# Patient Record
Sex: FEMALE | Race: WHITE | NOT HISPANIC OR LATINO | Employment: OTHER | URBAN - METROPOLITAN AREA
[De-identification: names, ages, dates, MRNs, and addresses within clinical notes are randomized per-mention and may not be internally consistent; named-entity substitution may affect disease eponyms.]

---

## 2017-08-21 ENCOUNTER — HISTORICAL (OUTPATIENT)
Dept: ADMINISTRATIVE | Facility: HOSPITAL | Age: 52
End: 2017-08-21

## 2017-12-18 ENCOUNTER — HISTORICAL (OUTPATIENT)
Dept: ANESTHESIOLOGY | Facility: HOSPITAL | Age: 52
End: 2017-12-18

## 2017-12-19 ENCOUNTER — HISTORICAL (OUTPATIENT)
Dept: LAB | Facility: HOSPITAL | Age: 52
End: 2017-12-19

## 2017-12-22 ENCOUNTER — HISTORICAL (OUTPATIENT)
Dept: ADMINISTRATIVE | Facility: HOSPITAL | Age: 52
End: 2017-12-22

## 2018-04-06 ENCOUNTER — HISTORICAL (OUTPATIENT)
Dept: LAB | Facility: HOSPITAL | Age: 53
End: 2018-04-06

## 2018-04-06 LAB
ABS NEUT (OLG): 1.7 X10(3)/MCL (ref 2.1–9.2)
ALBUMIN SERPL-MCNC: 4.1 GM/DL (ref 3.4–5)
ALBUMIN/GLOB SERPL: 1.1 RATIO (ref 1.1–2)
ALP SERPL-CCNC: 80 UNIT/L (ref 38–126)
ALT SERPL-CCNC: 16 UNIT/L (ref 12–78)
APTT PPP: 30.8 SECOND(S) (ref 24.8–36.9)
AST SERPL-CCNC: 15 UNIT/L (ref 15–37)
BASOPHILS # BLD AUTO: 0.1 X10(3)/MCL (ref 0–0.2)
BASOPHILS NFR BLD AUTO: 1 %
BILIRUB SERPL-MCNC: 0.4 MG/DL (ref 0.2–1)
BILIRUBIN DIRECT+TOT PNL SERPL-MCNC: 0.1 MG/DL (ref 0–0.5)
BILIRUBIN DIRECT+TOT PNL SERPL-MCNC: 0.3 MG/DL (ref 0–0.8)
BUN SERPL-MCNC: 17 MG/DL (ref 7–18)
CALCIUM SERPL-MCNC: 10.1 MG/DL (ref 8.5–10.1)
CHLORIDE SERPL-SCNC: 104 MMOL/L (ref 98–107)
CO2 SERPL-SCNC: 30 MMOL/L (ref 21–32)
CREAT SERPL-MCNC: 0.64 MG/DL (ref 0.55–1.02)
EOSINOPHIL # BLD AUTO: 0.4 X10(3)/MCL (ref 0–0.9)
EOSINOPHIL NFR BLD AUTO: 9 %
ERYTHROCYTE [DISTWIDTH] IN BLOOD BY AUTOMATED COUNT: 13.4 % (ref 11.5–17)
GLOBULIN SER-MCNC: 3.9 GM/DL (ref 2.4–3.5)
GLUCOSE SERPL-MCNC: 84 MG/DL (ref 74–106)
HCT VFR BLD AUTO: 38.6 % (ref 37–47)
HGB BLD-MCNC: 12.1 GM/DL (ref 12–16)
INR PPP: 0.89 (ref 0–1.27)
LYMPHOCYTES # BLD AUTO: 2.3 X10(3)/MCL (ref 0.6–4.6)
LYMPHOCYTES NFR BLD AUTO: 46 %
MCH RBC QN AUTO: 30.3 PG (ref 27–31)
MCHC RBC AUTO-ENTMCNC: 31.3 GM/DL (ref 33–36)
MCV RBC AUTO: 96.7 FL (ref 80–94)
MONOCYTES # BLD AUTO: 0.4 X10(3)/MCL (ref 0.1–1.3)
MONOCYTES NFR BLD AUTO: 9 %
NEUTROPHILS # BLD AUTO: 1.7 X10(3)/MCL (ref 2.1–9.2)
NEUTROPHILS NFR BLD AUTO: 35 %
PLATELET # BLD AUTO: 247 X10(3)/MCL (ref 130–400)
PMV BLD AUTO: 11 FL (ref 9.4–12.4)
POTASSIUM SERPL-SCNC: 4.6 MMOL/L (ref 3.5–5.1)
PROT SERPL-MCNC: 8 GM/DL (ref 6.4–8.2)
PROTHROMBIN TIME: 12.3 SECOND(S) (ref 12.2–14.7)
RBC # BLD AUTO: 3.99 X10(6)/MCL (ref 4.2–5.4)
SODIUM SERPL-SCNC: 137 MMOL/L (ref 136–145)
WBC # SPEC AUTO: 4.9 X10(3)/MCL (ref 4.5–11.5)

## 2018-04-10 ENCOUNTER — HISTORICAL (OUTPATIENT)
Dept: ADMINISTRATIVE | Facility: HOSPITAL | Age: 53
End: 2018-04-10

## 2018-08-03 ENCOUNTER — HISTORICAL (OUTPATIENT)
Dept: ADMINISTRATIVE | Facility: HOSPITAL | Age: 53
End: 2018-08-03

## 2018-08-15 ENCOUNTER — HISTORICAL (OUTPATIENT)
Dept: INTENSIVE CARE | Facility: HOSPITAL | Age: 53
End: 2018-08-15

## 2019-02-28 ENCOUNTER — HISTORICAL (OUTPATIENT)
Dept: ADMINISTRATIVE | Facility: HOSPITAL | Age: 54
End: 2019-02-28

## 2019-02-28 LAB
ABS NEUT (OLG): 5.56 X10(3)/MCL (ref 2.1–9.2)
ALBUMIN SERPL-MCNC: 3.8 GM/DL (ref 3.4–5)
ALBUMIN/GLOB SERPL: 1 {RATIO}
ALP SERPL-CCNC: 87 UNIT/L (ref 38–126)
ALT SERPL-CCNC: 16 UNIT/L (ref 12–78)
AST SERPL-CCNC: 14 UNIT/L (ref 15–37)
BASOPHILS # BLD AUTO: 0 X10(3)/MCL (ref 0–0.2)
BASOPHILS NFR BLD AUTO: 0 %
BILIRUB SERPL-MCNC: 0.1 MG/DL (ref 0.2–1)
BILIRUBIN DIRECT+TOT PNL SERPL-MCNC: 0 MG/DL (ref 0–0.8)
BILIRUBIN DIRECT+TOT PNL SERPL-MCNC: 0.1 MG/DL (ref 0–0.2)
BUN SERPL-MCNC: 17 MG/DL (ref 7–18)
CALCIUM SERPL-MCNC: 9.1 MG/DL (ref 8.5–10.1)
CHLORIDE SERPL-SCNC: 106 MMOL/L (ref 98–107)
CO2 SERPL-SCNC: 28 MMOL/L (ref 21–32)
CREAT SERPL-MCNC: 0.72 MG/DL (ref 0.55–1.02)
EOSINOPHIL # BLD AUTO: 0.3 X10(3)/MCL (ref 0–0.9)
EOSINOPHIL NFR BLD AUTO: 3 %
ERYTHROCYTE [DISTWIDTH] IN BLOOD BY AUTOMATED COUNT: 13.6 % (ref 11.5–17)
GLOBULIN SER-MCNC: 3.7 GM/DL (ref 2.4–3.5)
GLUCOSE SERPL-MCNC: 95 MG/DL (ref 74–106)
HBV SURFACE AG SERPL QL IA: NEGATIVE
HCT VFR BLD AUTO: 35.1 % (ref 37–47)
HCV AB SERPL QL IA: NEGATIVE
HGB BLD-MCNC: 10.4 GM/DL (ref 12–16)
LYMPHOCYTES # BLD AUTO: 2.5 X10(3)/MCL (ref 0.6–4.6)
LYMPHOCYTES NFR BLD AUTO: 27 %
MAGNESIUM SERPL-MCNC: 1.9 MG/DL (ref 1.8–2.4)
MCH RBC QN AUTO: 29.9 PG (ref 27–31)
MCHC RBC AUTO-ENTMCNC: 29.6 GM/DL (ref 33–36)
MCV RBC AUTO: 100.9 FL (ref 80–94)
MONOCYTES # BLD AUTO: 0.7 X10(3)/MCL (ref 0.1–1.3)
MONOCYTES NFR BLD AUTO: 8 %
NEUTROPHILS # BLD AUTO: 5.56 X10(3)/MCL (ref 2.1–9.2)
NEUTROPHILS NFR BLD AUTO: 61 %
PLATELET # BLD AUTO: 327 X10(3)/MCL (ref 130–400)
PMV BLD AUTO: 10.8 FL (ref 9.4–12.4)
POTASSIUM SERPL-SCNC: 4.5 MMOL/L (ref 3.5–5.1)
PROT SERPL-MCNC: 7.5 GM/DL (ref 6.4–8.2)
RBC # BLD AUTO: 3.48 X10(6)/MCL (ref 4.2–5.4)
SODIUM SERPL-SCNC: 139 MMOL/L (ref 136–145)
WBC # SPEC AUTO: 9.1 X10(3)/MCL (ref 4.5–11.5)

## 2019-03-04 ENCOUNTER — HISTORICAL (OUTPATIENT)
Dept: ADMINISTRATIVE | Facility: HOSPITAL | Age: 54
End: 2019-03-04

## 2019-03-06 LAB — FINAL CULTURE: NORMAL

## 2019-03-21 ENCOUNTER — TELEPHONE (OUTPATIENT)
Dept: GASTROENTEROLOGY | Facility: CLINIC | Age: 54
End: 2019-03-21

## 2019-04-04 LAB — CRC RECOMMENDATION EXT: NORMAL

## 2019-04-15 ENCOUNTER — TELEPHONE (OUTPATIENT)
Dept: GASTROENTEROLOGY | Facility: CLINIC | Age: 54
End: 2019-04-15

## 2019-04-15 NOTE — TELEPHONE ENCOUNTER
----- Message from Judy Brooke RN sent at 4/12/2019  5:16 PM CDT -----  ELKIN  ----- Message -----  From: Rolando Rodriguez  Sent: 4/11/2019   8:40 AM  To: Judy Brooke RN    Good morning. Just was letting you know some additional records you were waiting for were faxed over. I scanned them into media mgr within Epic,thanks

## 2019-04-15 NOTE — TELEPHONE ENCOUNTER
Printed pt's media from [employer]. from 04/11.    Contacted referring MD and spoke w/ Carley.   Informed Carley Fam is out of office. Once she returns we will talk to her about the pt and contact them after.

## 2019-04-23 ENCOUNTER — TELEPHONE (OUTPATIENT)
Dept: GASTROENTEROLOGY | Facility: CLINIC | Age: 54
End: 2019-04-23

## 2019-04-23 NOTE — TELEPHONE ENCOUNTER
Message left for patient to return call regarding scheduling an appointment per Dr Carpio referral.

## 2019-04-24 ENCOUNTER — TELEPHONE (OUTPATIENT)
Dept: GASTROENTEROLOGY | Facility: CLINIC | Age: 54
End: 2019-04-24

## 2019-04-24 NOTE — TELEPHONE ENCOUNTER
Spoke with patient. Appointment scheduled for 7/19/19 at 3pm. Appointment letter mailed to patient.

## 2019-07-19 ENCOUNTER — OFFICE VISIT (OUTPATIENT)
Dept: GASTROENTEROLOGY | Facility: CLINIC | Age: 54
End: 2019-07-19
Payer: COMMERCIAL

## 2019-07-19 ENCOUNTER — LAB VISIT (OUTPATIENT)
Dept: LAB | Facility: HOSPITAL | Age: 54
End: 2019-07-19
Attending: INTERNAL MEDICINE
Payer: COMMERCIAL

## 2019-07-19 VITALS
SYSTOLIC BLOOD PRESSURE: 121 MMHG | DIASTOLIC BLOOD PRESSURE: 73 MMHG | HEIGHT: 62 IN | HEART RATE: 81 BPM | BODY MASS INDEX: 23 KG/M2 | WEIGHT: 125 LBS

## 2019-07-19 DIAGNOSIS — R19.7 DIARRHEA, UNSPECIFIED TYPE: ICD-10-CM

## 2019-07-19 DIAGNOSIS — R19.7 DIARRHEA, UNSPECIFIED TYPE: Primary | ICD-10-CM

## 2019-07-19 PROCEDURE — 99204 OFFICE O/P NEW MOD 45 MIN: CPT | Mod: S$GLB,,, | Performed by: INTERNAL MEDICINE

## 2019-07-19 PROCEDURE — 3008F BODY MASS INDEX DOCD: CPT | Mod: CPTII,S$GLB,, | Performed by: INTERNAL MEDICINE

## 2019-07-19 PROCEDURE — 99999 PR PBB SHADOW E&M-EST. PATIENT-LVL III: ICD-10-PCS | Mod: PBBFAC,,, | Performed by: INTERNAL MEDICINE

## 2019-07-19 PROCEDURE — 99999 PR PBB SHADOW E&M-EST. PATIENT-LVL III: CPT | Mod: PBBFAC,,, | Performed by: INTERNAL MEDICINE

## 2019-07-19 PROCEDURE — 36415 COLL VENOUS BLD VENIPUNCTURE: CPT

## 2019-07-19 PROCEDURE — 3008F PR BODY MASS INDEX (BMI) DOCUMENTED: ICD-10-PCS | Mod: CPTII,S$GLB,, | Performed by: INTERNAL MEDICINE

## 2019-07-19 PROCEDURE — 99204 PR OFFICE/OUTPT VISIT, NEW, LEVL IV, 45-59 MIN: ICD-10-PCS | Mod: S$GLB,,, | Performed by: INTERNAL MEDICINE

## 2019-07-19 PROCEDURE — 83516 IMMUNOASSAY NONANTIBODY: CPT | Mod: 59

## 2019-07-19 NOTE — PATIENT INSTRUCTIONS
Pepto bismol tablet 3 tabs up to three times a day.  Discontinue Zoloft after discussing with you doctor  Discontinue Prilosec OTC and try Pepcid instead  Discontinue Excedrin migraine and discuss with a neurologist regarding alternative treatment

## 2019-07-19 NOTE — LETTER
July 19, 2019      Farrukh Carpio MD  [address] [facility]  [facility] [address]           [facility] G[facility]  [address]  [facility] [address]  Phone: [phone]  Fax: [phone]          Patient: Roseann Day   MR Number: [id number]   YOB: 1965   Date of Visit: 7/19/2019       Dear Dr. Farrukh Carpio:    Thank you for referring Roseann Day to me for evaluation. Attached you will find relevant portions of my assessment and plan of care.    If you have questions, please do not hesitate to call me. I look forward to following Roseann Day along with you.    Sincerely,    Nir Brooke MD    Enclosure  CC:  No Recipients    If you would like to receive this communication electronically, please contact [email or web address] or [phone] to request more information on [employer] access.    For providers and/or their staff who would like to refer a patient to [facility], please contact us through our one-stop-shop provider referral line, [facility], at [phone].    If you feel you have received this communication in error or would no longer like to receive these types of communications, please e-mail [email or web address]

## 2019-07-19 NOTE — PROGRESS NOTES
Reason for visit:  History of microscopic colitis    HPI:  Ms. Day is a 54-year-old who has been diagnosed with collagenous colitis in the past but the most recent colonoscopy did not reveal microscopic colitis, in fact the most recent 1 from April of 2019 revealed mild acute colitis.  In the past she has been treated with Entocort given the severity of her diarrhea to 10-15 times a day with nocturnal component.  She denies any blood in the stool.  Upon review of her medications she currently is on Zoloft and a proton pump inhibitor (Prilosec OTC) which are both implicated and microscopic colitis.  She is also taking Excedrin migraine twice a day which has aspirin as 1 of his components, again implicated in microscopic colitis.  Today we discussed about discontinuing Zoloft after discussing with a physician since it was initially started for depression after her parent past.  She will discontinue Prilosec OTC and try Pepcid instead.  As for as Excedrin migraine is concerned she will get in touch with the neurologist for better alternatives.  She is currently on Entocort 9 mg daily has been on it for 3 months with good results.  We discussed about Pepto-Bismol as an alternative to Entocort and she may consider doing that once Entocort is weaned off.  Given the discrepancy in the most recent biopsies I have suggested a repeat colonoscopy if her symptoms do not improve.  Her current weight is stable.  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    Past medical, surgical, social and family history reviewed in epic    Medication allergies reviewed in epic.    Review of systems:  Constitutional:  No fever, no chills, no weight loss, appetite is stable  Eyes:  No visual changes or red eyes  ENT:  No odynophagia or hoarseness of voice  CARDIOVASCULAR:  NO ANGINA OR PALPITATION  Respiratory:  No shortness of breath or wheezing  Genitourinary:  Frequent UTIs, no  hematuria  Skin:  No pruritus or eczema, easy bruising  Neurologic:  Frequent headaches, no seizures  Psychiatry:  No depression currently, no anxiety  Gastrointestinal:  See HPI    Physical exam:  Vitals see epic, awake alert oriented x3 in no acute distress  Neck:  Supple, no carotid bruit  Eyes:  Conjunctivae anicteric, not injected  ENT:  Oral mucosa moist  Abdomen:  Flat, soft, mild tenderness to deep palpation diffusely, bowel sounds are normal, no abdominal bruits heard, no organomegaly  Cardiovascular:  S1, S2 normal, no murmurs or gallops  Respiratory:  Bilateral air entry equal with no rhonchi or crackles  Skin:  Ecchymosis noted  Lower extremities:  No pedal edema    Impression:  Microscopic colitis-currently responding to Entocort    Recommendation:  1.  Discontinue Zoloft, Prilosec and Excedrin migraine  2.  Continue Entocort for now and upon weaning off Entocort consider Pepto-Bismol tablet 3 3 times a day  3.  Check celiac serology  4.  If symptoms do not improve we will consider a repeat colonoscopy with biopsies.

## 2019-07-22 ENCOUNTER — TELEPHONE (OUTPATIENT)
Dept: GASTROENTEROLOGY | Facility: CLINIC | Age: 54
End: 2019-07-22

## 2019-07-22 LAB
GLIADIN PEPTIDE IGA SER-ACNC: 4 UNITS
GLIADIN PEPTIDE IGG SER-ACNC: 2 UNITS
IGA SERPL-MCNC: 296 MG/DL (ref 70–400)
TTG IGA SER-ACNC: 7 UNITS
TTG IGG SER-ACNC: 4 UNITS

## 2019-07-23 ENCOUNTER — TELEPHONE (OUTPATIENT)
Dept: GASTROENTEROLOGY | Facility: CLINIC | Age: 54
End: 2019-07-23

## 2019-07-23 NOTE — TELEPHONE ENCOUNTER
----- Message from Nir Brooke MD sent at 7/22/2019  1:31 PM CDT -----  Please notify patient, the blood test for Celiac disease is negative.

## 2020-02-25 NOTE — TELEPHONE ENCOUNTER
----- Message from Soledad Dougherty sent at 4/23/2019  3:46 PM CDT -----  Contact: Self- [phone]  Pt returning missed call to schedule referral appt- please contact pt at [phone]   PAST MEDICAL HISTORY:  Coronary artery disease     Hypertension

## 2020-07-07 ENCOUNTER — HISTORICAL (OUTPATIENT)
Dept: RADIOLOGY | Facility: HOSPITAL | Age: 55
End: 2020-07-07

## 2020-07-14 ENCOUNTER — HISTORICAL (OUTPATIENT)
Dept: SURGERY | Facility: HOSPITAL | Age: 55
End: 2020-07-14

## 2020-07-14 LAB
ALBUMIN SERPL-MCNC: 3.9 GM/DL (ref 3.5–5)
ALBUMIN/GLOB SERPL: 1.2 RATIO (ref 1.1–2)
ALP SERPL-CCNC: 77 UNIT/L (ref 40–150)
ALT SERPL-CCNC: 20 UNIT/L (ref 0–55)
AST SERPL-CCNC: 23 UNIT/L (ref 5–34)
BILIRUB SERPL-MCNC: 0.3 MG/DL
BILIRUBIN DIRECT+TOT PNL SERPL-MCNC: 0.1 MG/DL (ref 0–0.5)
BILIRUBIN DIRECT+TOT PNL SERPL-MCNC: 0.2 MG/DL (ref 0–0.8)
BUN SERPL-MCNC: 17.3 MG/DL (ref 9.8–20.1)
CALCIUM SERPL-MCNC: 9.2 MG/DL (ref 8.4–10.2)
CHLORIDE SERPL-SCNC: 101 MMOL/L (ref 98–107)
CO2 SERPL-SCNC: 28 MMOL/L (ref 22–29)
CREAT SERPL-MCNC: 0.78 MG/DL (ref 0.55–1.02)
ERYTHROCYTE [DISTWIDTH] IN BLOOD BY AUTOMATED COUNT: 12.9 % (ref 11.5–17)
GLOBULIN SER-MCNC: 3.2 GM/DL (ref 2.4–3.5)
GLUCOSE SERPL-MCNC: 169 MG/DL (ref 74–100)
HCT VFR BLD AUTO: 39.6 % (ref 37–47)
HGB BLD-MCNC: 12.6 GM/DL (ref 12–16)
MCH RBC QN AUTO: 31.5 PG (ref 27–31)
MCHC RBC AUTO-ENTMCNC: 31.8 GM/DL (ref 33–36)
MCV RBC AUTO: 99 FL (ref 80–94)
PLATELET # BLD AUTO: 291 X10(3)/MCL (ref 130–400)
PMV BLD AUTO: 10.9 FL (ref 9.4–12.4)
POTASSIUM SERPL-SCNC: 3.9 MMOL/L (ref 3.5–5.1)
PROT SERPL-MCNC: 7.1 GM/DL (ref 6.4–8.3)
RBC # BLD AUTO: 4 X10(6)/MCL (ref 4.2–5.4)
SODIUM SERPL-SCNC: 139 MMOL/L (ref 136–145)
WBC # SPEC AUTO: 6.9 X10(3)/MCL (ref 4.5–11.5)

## 2021-03-05 ENCOUNTER — HISTORICAL (OUTPATIENT)
Dept: LAB | Facility: HOSPITAL | Age: 56
End: 2021-03-05

## 2021-03-05 LAB
ALBUMIN SERPL-MCNC: 4.3 GM/DL (ref 3.5–5)
ALBUMIN/GLOB SERPL: 1.3 RATIO (ref 1.1–2)
ALP SERPL-CCNC: 84 UNIT/L (ref 40–150)
ALT SERPL-CCNC: 35 UNIT/L (ref 0–55)
AST SERPL-CCNC: 27 UNIT/L (ref 5–34)
BILIRUB SERPL-MCNC: 0.2 MG/DL
BILIRUBIN DIRECT+TOT PNL SERPL-MCNC: 0.1 MG/DL (ref 0–0.5)
BILIRUBIN DIRECT+TOT PNL SERPL-MCNC: 0.1 MG/DL (ref 0–0.8)
BUN SERPL-MCNC: 12.1 MG/DL (ref 9.8–20.1)
CALCIUM SERPL-MCNC: 8.9 MG/DL (ref 8.4–10.2)
CHLORIDE SERPL-SCNC: 104 MMOL/L (ref 98–107)
CO2 SERPL-SCNC: 27 MMOL/L (ref 22–29)
CREAT SERPL-MCNC: 0.74 MG/DL (ref 0.55–1.02)
ERYTHROCYTE [DISTWIDTH] IN BLOOD BY AUTOMATED COUNT: 13.9 % (ref 11.5–17)
GLOBULIN SER-MCNC: 3.3 GM/DL (ref 2.4–3.5)
GLUCOSE SERPL-MCNC: 89 MG/DL (ref 74–100)
HCT VFR BLD AUTO: 41.6 % (ref 37–47)
HGB BLD-MCNC: 12.8 GM/DL (ref 12–16)
MCH RBC QN AUTO: 30.9 PG (ref 27–31)
MCHC RBC AUTO-ENTMCNC: 30.8 GM/DL (ref 33–36)
MCV RBC AUTO: 100.5 FL (ref 80–94)
PLATELET # BLD AUTO: 303 X10(3)/MCL (ref 130–400)
PMV BLD AUTO: 11.8 FL (ref 9.4–12.4)
POTASSIUM SERPL-SCNC: 3.9 MMOL/L (ref 3.5–5.1)
PROT SERPL-MCNC: 7.6 GM/DL (ref 6.4–8.3)
RBC # BLD AUTO: 4.14 X10(6)/MCL (ref 4.2–5.4)
SARS-COV-2 RNA RESP QL NAA+PROBE: NOT DETECTED
SODIUM SERPL-SCNC: 141 MMOL/L (ref 136–145)
WBC # SPEC AUTO: 7.2 X10(3)/MCL (ref 4.5–11.5)

## 2021-04-28 LAB — SARS-COV-2 RNA RESP QL NAA+PROBE: NOT DETECTED

## 2021-04-30 ENCOUNTER — HOSPITAL ENCOUNTER (OUTPATIENT)
Dept: MEDSURG UNIT | Facility: HOSPITAL | Age: 56
End: 2021-05-01
Attending: SURGERY | Admitting: SURGERY

## 2021-04-30 LAB
ABS NEUT (OLG): 9.92 X10(3)/MCL (ref 2.1–9.2)
BASOPHILS # BLD AUTO: 0 X10(3)/MCL (ref 0–0.2)
BASOPHILS NFR BLD AUTO: 0 %
BUN SERPL-MCNC: 10 MG/DL (ref 9.8–20.1)
CALCIUM SERPL-MCNC: 8.3 MG/DL (ref 8.4–10.2)
CHLORIDE SERPL-SCNC: 106 MMOL/L (ref 98–107)
CO2 SERPL-SCNC: 25 MMOL/L (ref 22–29)
CREAT SERPL-MCNC: 0.69 MG/DL (ref 0.55–1.02)
CREAT/UREA NIT SERPL: 14
EOSINOPHIL # BLD AUTO: 0.2 X10(3)/MCL (ref 0–0.9)
EOSINOPHIL NFR BLD AUTO: 2 %
ERYTHROCYTE [DISTWIDTH] IN BLOOD BY AUTOMATED COUNT: 14.9 % (ref 11.5–17)
GLUCOSE SERPL-MCNC: 104 MG/DL (ref 74–100)
HCT VFR BLD AUTO: 38.8 % (ref 37–47)
HGB BLD-MCNC: 11.9 GM/DL (ref 12–16)
LYMPHOCYTES # BLD AUTO: 1.9 X10(3)/MCL (ref 0.6–4.6)
LYMPHOCYTES NFR BLD AUTO: 15 %
MCH RBC QN AUTO: 30.7 PG (ref 27–31)
MCHC RBC AUTO-ENTMCNC: 30.7 GM/DL (ref 33–36)
MCV RBC AUTO: 100.3 FL (ref 80–94)
MONOCYTES # BLD AUTO: 0.6 X10(3)/MCL (ref 0.1–1.3)
MONOCYTES NFR BLD AUTO: 5 %
NEUTROPHILS # BLD AUTO: 9.92 X10(3)/MCL (ref 2.1–9.2)
NEUTROPHILS NFR BLD AUTO: 78 %
PLATELET # BLD AUTO: 282 X10(3)/MCL (ref 130–400)
PMV BLD AUTO: 11.6 FL (ref 9.4–12.4)
POTASSIUM SERPL-SCNC: 4.4 MMOL/L (ref 3.5–5.1)
RBC # BLD AUTO: 3.87 X10(6)/MCL (ref 4.2–5.4)
SODIUM SERPL-SCNC: 143 MMOL/L (ref 136–145)
WBC # SPEC AUTO: 12.7 X10(3)/MCL (ref 4.5–11.5)

## 2021-05-01 LAB
ABS NEUT (OLG): 5.33 X10(3)/MCL (ref 2.1–9.2)
BASOPHILS # BLD AUTO: 0 X10(3)/MCL (ref 0–0.2)
BASOPHILS NFR BLD AUTO: 0 %
BUN SERPL-MCNC: 4.1 MG/DL (ref 9.8–20.1)
CALCIUM SERPL-MCNC: 8.6 MG/DL (ref 8.4–10.2)
CHLORIDE SERPL-SCNC: 109 MMOL/L (ref 98–107)
CO2 SERPL-SCNC: 25 MMOL/L (ref 22–29)
CREAT SERPL-MCNC: 0.61 MG/DL (ref 0.55–1.02)
CREAT/UREA NIT SERPL: 7
EOSINOPHIL # BLD AUTO: 0 X10(3)/MCL (ref 0–0.9)
EOSINOPHIL NFR BLD AUTO: 0 %
ERYTHROCYTE [DISTWIDTH] IN BLOOD BY AUTOMATED COUNT: 15 % (ref 11.5–17)
GLUCOSE SERPL-MCNC: 108 MG/DL (ref 74–100)
HCT VFR BLD AUTO: 32.7 % (ref 37–47)
HGB BLD-MCNC: 10.4 GM/DL (ref 12–16)
LYMPHOCYTES # BLD AUTO: 2.5 X10(3)/MCL (ref 0.6–4.6)
LYMPHOCYTES NFR BLD AUTO: 29 %
MCH RBC QN AUTO: 31 PG (ref 27–31)
MCHC RBC AUTO-ENTMCNC: 31.8 GM/DL (ref 33–36)
MCV RBC AUTO: 97.3 FL (ref 80–94)
MONOCYTES # BLD AUTO: 0.7 X10(3)/MCL (ref 0.1–1.3)
MONOCYTES NFR BLD AUTO: 8 %
NEUTROPHILS # BLD AUTO: 5.33 X10(3)/MCL (ref 2.1–9.2)
NEUTROPHILS NFR BLD AUTO: 62 %
PLATELET # BLD AUTO: 266 X10(3)/MCL (ref 130–400)
PMV BLD AUTO: 11.5 FL (ref 9.4–12.4)
POTASSIUM SERPL-SCNC: 3.6 MMOL/L (ref 3.5–5.1)
RBC # BLD AUTO: 3.36 X10(6)/MCL (ref 4.2–5.4)
SODIUM SERPL-SCNC: 142 MMOL/L (ref 136–145)
WBC # SPEC AUTO: 8.6 X10(3)/MCL (ref 4.5–11.5)

## 2021-12-02 ENCOUNTER — HISTORICAL (OUTPATIENT)
Dept: PREADMISSION TESTING | Facility: HOSPITAL | Age: 56
End: 2021-12-02

## 2021-12-02 LAB
ABS NEUT (OLG): 6 X10(3)/MCL (ref 2.1–9.2)
ALBUMIN SERPL-MCNC: 4 GM/DL (ref 3.5–5)
ALBUMIN/GLOB SERPL: 1.4 RATIO (ref 1.1–2)
ALP SERPL-CCNC: 56 UNIT/L (ref 40–150)
ALT SERPL-CCNC: 23 UNIT/L (ref 0–55)
APTT PPP: 33.3 SECOND(S) (ref 23.2–33.7)
AST SERPL-CCNC: 28 UNIT/L (ref 5–34)
BASOPHILS # BLD AUTO: 0 X10(3)/MCL (ref 0–0.2)
BASOPHILS NFR BLD AUTO: 0 %
BILIRUB SERPL-MCNC: 0.2 MG/DL
BILIRUBIN DIRECT+TOT PNL SERPL-MCNC: <0.1 MG/DL (ref 0–0.5)
BILIRUBIN DIRECT+TOT PNL SERPL-MCNC: >0.1 MG/DL (ref 0–0.8)
BUN SERPL-MCNC: 8.6 MG/DL (ref 9.8–20.1)
CALCIUM SERPL-MCNC: 9.5 MG/DL (ref 8.7–10.5)
CHLORIDE SERPL-SCNC: 106 MMOL/L (ref 98–107)
CO2 SERPL-SCNC: 30 MMOL/L (ref 22–29)
CREAT SERPL-MCNC: 0.7 MG/DL (ref 0.55–1.02)
EOSINOPHIL # BLD AUTO: 0.1 X10(3)/MCL (ref 0–0.9)
EOSINOPHIL NFR BLD AUTO: 1 %
ERYTHROCYTE [DISTWIDTH] IN BLOOD BY AUTOMATED COUNT: 14.6 % (ref 11.5–17)
GLOBULIN SER-MCNC: 2.8 GM/DL (ref 2.4–3.5)
GLUCOSE SERPL-MCNC: 74 MG/DL (ref 74–100)
HCT VFR BLD AUTO: 38.9 % (ref 37–47)
HGB BLD-MCNC: 11.9 GM/DL (ref 12–16)
INR PPP: 1 (ref 0–1.3)
LYMPHOCYTES # BLD AUTO: 3 X10(3)/MCL (ref 0.6–4.6)
LYMPHOCYTES NFR BLD AUTO: 31 %
MCH RBC QN AUTO: 30.2 PG (ref 27–31)
MCHC RBC AUTO-ENTMCNC: 30.6 GM/DL (ref 33–36)
MCV RBC AUTO: 98.7 FL (ref 80–94)
MONOCYTES # BLD AUTO: 0.6 X10(3)/MCL (ref 0.1–1.3)
MONOCYTES NFR BLD AUTO: 6 %
NEUTROPHILS # BLD AUTO: 6 X10(3)/MCL (ref 2.1–9.2)
NEUTROPHILS NFR BLD AUTO: 62 %
PLATELET # BLD AUTO: 341 X10(3)/MCL (ref 130–400)
PMV BLD AUTO: 11.5 FL (ref 9.4–12.4)
POTASSIUM SERPL-SCNC: 3.9 MMOL/L (ref 3.5–5.1)
PROT SERPL-MCNC: 6.8 GM/DL (ref 6.4–8.3)
PROTHROMBIN TIME: 12.5 SECOND(S) (ref 12.5–14.5)
RBC # BLD AUTO: 3.94 X10(6)/MCL (ref 4.2–5.4)
SODIUM SERPL-SCNC: 144 MMOL/L (ref 136–145)
WBC # SPEC AUTO: 9.8 X10(3)/MCL (ref 4.5–11.5)

## 2021-12-09 ENCOUNTER — HISTORICAL (OUTPATIENT)
Dept: SURGERY | Facility: HOSPITAL | Age: 56
End: 2021-12-09

## 2022-04-10 ENCOUNTER — HISTORICAL (OUTPATIENT)
Dept: ADMINISTRATIVE | Facility: HOSPITAL | Age: 57
End: 2022-04-10
Payer: COMMERCIAL

## 2022-04-29 VITALS
DIASTOLIC BLOOD PRESSURE: 86 MMHG | BODY MASS INDEX: 18.4 KG/M2 | WEIGHT: 100 LBS | SYSTOLIC BLOOD PRESSURE: 126 MMHG | HEIGHT: 62 IN

## 2022-04-30 NOTE — OP NOTE
DATE OF SURGERY:    12/09/2021    SURGEON:  Lan Mota MD    PREOPERATIVE DIAGNOSIS:  Chronic obstructive adenotonsillitis.    POSTOPERATIVE DIAGNOSIS:  Chronic obstructive adenotonsillitis.    OPERATION PERFORMED:  KTP laser tonsillectomy and adenoidectomy.    PROCEDURE IN DETAIL:  With proper consent and information, the patient was brought to the operating room and placed on the operating room table in supine position.  After satisfactory endotracheal intubation and general anesthesia, the patient was placed asleep.  The nasopharynx was explored.  The patient had a large amount of adenoid tissue.  This was removed with the KTP laser.  The tonsils were dissected out of the tonsillar fossa using the KTP laser.  She had what appeared to be a mucopyocele in the left tonsil as we detected preoperatively that the left tonsil was involved.  This was sent separately for further evaluation and pathologic evaluation.  She tolerated the procedure very well.  She was transferred back to the recovery room awake, alert, and responsive.        ______________________________  Lan Mota MD    [facility]  DD:  12/13/2021  Time:  12:53PM  DT:  12/13/2021  Time:  05:17PM  Job #:  [id number]

## 2022-04-30 NOTE — OP NOTE
DATE OF SURGERY:    12/22/2017    SURGEON:  Jesus Doss MD    PRE-PROCEDURE DIAGNOSIS:  Biliary dyskinesia.    POST-PROCEDURE DIAGNOSIS:  Biliary dyskinesia.    PROCEDURE PERFORMED:  Laparoscopic cholecystectomy.    ANESTHESIA:  General endotracheal.    INDICATIONS:  This is a 52-year-old female with increasing postprandial right upper quadrant pain radiating to her back and shoulder.  HIDA scan with ejection fraction decreased consistent with biliary dyskinesia.  She presents for an elective gallbladder resection at this time.  The patient does not have any stones.    DESCRIPTION OF PROCEDURE:  The patient was transferred to the operating room, placed on the operating room table in the supine position.  General anesthetic was administered per Anesthesia.  Patient tolerated this well.  Abdomen was prepped with chlorhexidine solution, draped with sterile drapes and cloth.  Time-out was taken to verify the correct patient, procedure, preoperative antibiotic was given.     After time-out was taken, the umbilicus was raised with a Gelpi clamp.  The subumbilical space was infiltrated with 0.5% Marcaine.  A 5 mm transverse incision was made with an 11 blade.  Veress needle was inserted into the abdomen.  The abdomen was insufflated 15 mmHg CO2.  Patient tolerated this well.  A 5 mm Visiport was then placed under direct vision into the abdomen.  No Veress needle injury was noted.  At this point, the patient was positioned with the head up, left side down to expose the gallbladder fossa.  An additional 10 mm subxiphoid port, 2 additional right subcostal 5 mm ports were placed in a similar fashion under direct laparoscopic vision.  After that was completed, the fundus of the gallbladder was grasped and retracted towards the right shoulder.  The gallbladder appeared to be distended with minimal peritoneal attachments consistent with biliary dyskinesia.  The infundibulum of the gallbladder was grasped, retracted  towards the right lower quadrant.  Peritoneum overlying the cystic duct and cystic artery was incised medially and a little laterally using hook electrocautery.  The gallbladder was taken up off the cystic plate to further expose critical view as the cystic duct and cystic artery were the 2 and only 2 structures entering the gallbladder at that point.  With critical view obtained, the cystic duct was doubly clipped proximally, singly clipped distally, transected with the laparoscopic scissors.  Using the automatic clip applier, the cystic artery was clipped in a similar fashion.  The gallbladder was then completely removed from the gallbladder fossa in an avascular plane using hook electrocautery.  It was then placed into the endoscopic retrieval bag and removed through the subxiphoid port without difficulty.  The gallbladder fossa appeared hemostatic.  The cystic duct and cystic artery stump clips were intact.  No bile or bleeding was noted.  At this point, the procedure was complete.  All ports were removed under direct vision.  No bleeding was noted.  The umbilical port was then removed.  Abdomen was allowed to completely desufflate.  Patient tolerated it well.     All skin incisions were irrigated with saline solution.  All skin incisions were then closed with simple interrupted inverted 4-0 Monocryl stitches.  Dermabond was placed to the skin.  The patient tolerated the procedure well.  All needle and sponge counts were correct.    COMPLICATIONS:  None.    SPECIMENS:  Gallbladder.    ESTIMATED BLOOD LOSS:  Less than 10 cc.        ______________________________  MD ALVARADO Sanchez/SD  DD:  12/22/2017  Time:  04:14PM  DT:  12/23/2017  Time:  02:04PM  Job #:  [id number]

## 2022-10-03 ENCOUNTER — DOCUMENTATION ONLY (OUTPATIENT)
Dept: ADMINISTRATIVE | Facility: HOSPITAL | Age: 57
End: 2022-10-03
Payer: COMMERCIAL

## 2023-02-19 ENCOUNTER — HOSPITAL ENCOUNTER (EMERGENCY)
Facility: HOSPITAL | Age: 58
Discharge: HOME | End: 2023-02-20
Attending: EMERGENCY MEDICINE
Payer: COMMERCIAL

## 2023-02-19 DIAGNOSIS — M54.16 LUMBAR RADICULOPATHY, ACUTE: Primary | ICD-10-CM

## 2023-02-19 DIAGNOSIS — M54.41 ACUTE RIGHT-SIDED LOW BACK PAIN WITH RIGHT-SIDED SCIATICA: ICD-10-CM

## 2023-02-19 DIAGNOSIS — M51.36 ANNULAR TEAR OF LUMBAR DISC: ICD-10-CM

## 2023-02-19 LAB
APPEARANCE UR: ABNORMAL
BACTERIA #/AREA URNS AUTO: ABNORMAL /HPF
BILIRUB UR QL STRIP.AUTO: NEGATIVE MG/DL
COLOR UR AUTO: YELLOW
GLUCOSE UR QL STRIP.AUTO: NEGATIVE MG/DL
KETONES UR QL STRIP.AUTO: ABNORMAL MG/DL
LEUKOCYTE ESTERASE UR QL STRIP.AUTO: ABNORMAL UNIT/L
NITRITE UR QL STRIP.AUTO: NEGATIVE
PH UR STRIP.AUTO: 5.5 [PH]
PROT UR QL STRIP.AUTO: ABNORMAL MG/DL
RBC #/AREA URNS AUTO: <5 /HPF
RBC UR QL AUTO: NEGATIVE UNIT/L
SP GR UR STRIP.AUTO: 1.02 (ref 1–1.03)
SQUAMOUS #/AREA URNS AUTO: 25 /HPF
UROBILINOGEN UR STRIP-ACNC: 1 MG/DL
WBC #/AREA URNS AUTO: 10 /HPF

## 2023-02-19 PROCEDURE — 63600175 PHARM REV CODE 636 W HCPCS: Performed by: NURSE PRACTITIONER

## 2023-02-19 PROCEDURE — 63600175 PHARM REV CODE 636 W HCPCS

## 2023-02-19 PROCEDURE — 96374 THER/PROPH/DIAG INJ IV PUSH: CPT

## 2023-02-19 PROCEDURE — 81001 URINALYSIS AUTO W/SCOPE: CPT

## 2023-02-19 PROCEDURE — 99285 EMERGENCY DEPT VISIT HI MDM: CPT | Mod: 25

## 2023-02-19 PROCEDURE — 96375 TX/PRO/DX INJ NEW DRUG ADDON: CPT

## 2023-02-19 PROCEDURE — 96372 THER/PROPH/DIAG INJ SC/IM: CPT | Mod: 59 | Performed by: NURSE PRACTITIONER

## 2023-02-19 PROCEDURE — 25000003 PHARM REV CODE 250

## 2023-02-19 PROCEDURE — 96372 THER/PROPH/DIAG INJ SC/IM: CPT

## 2023-02-19 RX ORDER — CYCLOBENZAPRINE HCL 10 MG
10 TABLET ORAL
Status: COMPLETED | OUTPATIENT
Start: 2023-02-19 | End: 2023-02-19

## 2023-02-19 RX ORDER — ONDANSETRON 2 MG/ML
4 INJECTION INTRAMUSCULAR; INTRAVENOUS
Status: COMPLETED | OUTPATIENT
Start: 2023-02-19 | End: 2023-02-19

## 2023-02-19 RX ORDER — MORPHINE SULFATE 4 MG/ML
4 INJECTION, SOLUTION INTRAMUSCULAR; INTRAVENOUS
Status: COMPLETED | OUTPATIENT
Start: 2023-02-19 | End: 2023-02-19

## 2023-02-19 RX ORDER — DEXAMETHASONE SODIUM PHOSPHATE 4 MG/ML
8 INJECTION, SOLUTION INTRA-ARTICULAR; INTRALESIONAL; INTRAMUSCULAR; INTRAVENOUS; SOFT TISSUE
Status: COMPLETED | OUTPATIENT
Start: 2023-02-19 | End: 2023-02-19

## 2023-02-19 RX ORDER — ORPHENADRINE CITRATE 30 MG/ML
60 INJECTION INTRAMUSCULAR; INTRAVENOUS
Status: COMPLETED | OUTPATIENT
Start: 2023-02-19 | End: 2023-02-19

## 2023-02-19 RX ORDER — KETOROLAC TROMETHAMINE 30 MG/ML
30 INJECTION, SOLUTION INTRAMUSCULAR; INTRAVENOUS
Status: DISCONTINUED | OUTPATIENT
Start: 2023-02-19 | End: 2023-02-19

## 2023-02-19 RX ORDER — HYDROMORPHONE HYDROCHLORIDE 2 MG/ML
1 INJECTION, SOLUTION INTRAMUSCULAR; INTRAVENOUS; SUBCUTANEOUS
Status: COMPLETED | OUTPATIENT
Start: 2023-02-19 | End: 2023-02-19

## 2023-02-19 RX ORDER — KETOROLAC TROMETHAMINE 30 MG/ML
30 INJECTION, SOLUTION INTRAMUSCULAR; INTRAVENOUS
Status: COMPLETED | OUTPATIENT
Start: 2023-02-19 | End: 2023-02-19

## 2023-02-19 RX ADMIN — KETOROLAC TROMETHAMINE 30 MG: 30 INJECTION, SOLUTION INTRAMUSCULAR; INTRAVENOUS at 08:02

## 2023-02-19 RX ADMIN — CYCLOBENZAPRINE 10 MG: 10 TABLET, FILM COATED ORAL at 07:02

## 2023-02-19 RX ADMIN — MORPHINE SULFATE 4 MG: 4 INJECTION INTRAVENOUS at 10:02

## 2023-02-19 RX ADMIN — ORPHENADRINE CITRATE 60 MG: 30 INJECTION INTRAMUSCULAR; INTRAVENOUS at 09:02

## 2023-02-19 RX ADMIN — HYDROMORPHONE HYDROCHLORIDE 1 MG: 2 INJECTION INTRAMUSCULAR; INTRAVENOUS; SUBCUTANEOUS at 11:02

## 2023-02-19 RX ADMIN — ONDANSETRON 4 MG: 2 INJECTION INTRAMUSCULAR; INTRAVENOUS at 10:02

## 2023-02-19 RX ADMIN — DEXAMETHASONE SODIUM PHOSPHATE 8 MG: 4 INJECTION, SOLUTION INTRA-ARTICULAR; INTRALESIONAL; INTRAMUSCULAR; INTRAVENOUS; SOFT TISSUE at 07:02

## 2023-02-19 RX ADMIN — HYDROMORPHONE HYDROCHLORIDE 1 MG: 2 INJECTION INTRAMUSCULAR; INTRAVENOUS; SUBCUTANEOUS at 09:02

## 2023-02-20 VITALS
DIASTOLIC BLOOD PRESSURE: 70 MMHG | HEIGHT: 62 IN | RESPIRATION RATE: 20 BRPM | TEMPERATURE: 98 F | BODY MASS INDEX: 21.16 KG/M2 | SYSTOLIC BLOOD PRESSURE: 138 MMHG | HEART RATE: 84 BPM | WEIGHT: 115 LBS | OXYGEN SATURATION: 100 %

## 2023-02-20 RX ORDER — GABAPENTIN 600 MG/1
600 TABLET ORAL 3 TIMES DAILY
Qty: 90 TABLET | Refills: 1 | Status: SHIPPED | OUTPATIENT
Start: 2023-02-20 | End: 2024-02-20

## 2023-02-20 RX ORDER — PREDNISONE 50 MG/1
50 TABLET ORAL DAILY
Qty: 5 TABLET | Refills: 0 | Status: SHIPPED | OUTPATIENT
Start: 2023-02-20 | End: 2023-02-25

## 2023-02-20 NOTE — FIRST PROVIDER EVALUATION
"Medical screening examination initiated.  I have conducted a focused provider triage encounter, findings are as follows:    Brief history of present illness:  58 year old female presents to ER with c/o lower back pain radiating down right leg. Reports history of sciatic nerve pain. States she took norco, muscle relaxer, and mobic today with no relief of pain.     Vitals:    02/19/23 1859   BP: 123/63   BP Location: Left arm   Patient Position: Sitting   Pulse: 86   Resp: 20   Temp: 97.8 °F (36.6 °C)   TempSrc: Oral   SpO2: 100%   Weight: 52.2 kg (115 lb)   Height: 5' 2.21" (1.58 m)       Pertinent physical exam:  Awake and alert, NAD.    Brief workup plan:  XR, meds    Preliminary workup initiated; this workup will be continued and followed by the physician or advanced practice provider that is assigned to the patient when roomed.  "

## 2023-02-20 NOTE — ED PROVIDER NOTES
Encounter Date: 2/19/2023       History     Chief Complaint   Patient presents with    Back Pain     Complaint of right lower back pain, with radiation down right leg.      57 yo female here with worsening of her chronic back pain. Pain radiates down her right leg. She is in pain management but states pain is way worse than normal even with her medicaitons.     The history is provided by the patient. No  was used.   Review of patient's allergies indicates:  No Known Allergies  Past Medical History:   Diagnosis Date    Sciatica of right side      Past Surgical History:   Procedure Laterality Date    apendix      APPENDECTOMY      CHOLECYSTECTOMY      COLONOSCOPY  04/04/2019    GALLBLADDER SURGERY      HYSTERECTOMY      SINUS SURGERY       Family History   Problem Relation Age of Onset    Colon cancer Neg Hx     Esophageal cancer Neg Hx      Social History     Tobacco Use    Smoking status: Every Day     Types: Vaping with nicotine    Smokeless tobacco: Never   Substance Use Topics    Alcohol use: Yes    Drug use: Never     Review of Systems   Constitutional:  Negative for fever.   Respiratory:  Negative for cough and shortness of breath.    Cardiovascular:  Negative for chest pain.   Gastrointestinal:  Negative for abdominal pain.   Genitourinary:  Negative for difficulty urinating and dysuria.   Musculoskeletal:  Positive for back pain. Negative for gait problem.   Skin:  Negative for color change.   Neurological:  Negative for dizziness, speech difficulty and headaches.   Psychiatric/Behavioral:  Negative for hallucinations and suicidal ideas.    All other systems reviewed and are negative.    Physical Exam     Initial Vitals [02/19/23 1859]   BP Pulse Resp Temp SpO2   123/63 86 20 97.8 °F (36.6 °C) 100 %      MAP       --         Physical Exam    Nursing note and vitals reviewed.  Constitutional: She appears well-developed and well-nourished.   HENT:   Head: Normocephalic.   Eyes: EOM are  normal.   Neck:   Normal range of motion.  Cardiovascular:  Normal rate, regular rhythm, normal heart sounds and intact distal pulses.           Pulmonary/Chest: Breath sounds normal. No respiratory distress.   Abdominal: Abdomen is soft. Bowel sounds are normal. There is no abdominal tenderness.   Musculoskeletal:         General: Normal range of motion.      Cervical back: Normal range of motion.      Comments: Tenderness to the right piriformis region of the buttocks     Neurological: She is alert and oriented to person, place, and time. She has normal strength.   Skin: Skin is warm and dry.   Psychiatric: She has a normal mood and affect. Her behavior is normal. Judgment and thought content normal.       ED Course   Procedures  Labs Reviewed   URINALYSIS, REFLEX TO URINE CULTURE - Abnormal; Notable for the following components:       Result Value    Appearance, UA Cloudy (*)     Protein, UA Trace (*)     Ketones, UA Trace (*)     Leukocyte Esterase, UA Trace (*)     All other components within normal limits   URINALYSIS, MICROSCOPIC - Abnormal; Notable for the following components:    WBC, UA 10 (*)     Squamous Epithelial Cells, UA 25 (*)     Bacteria, UA 1+ (*)     All other components within normal limits          Imaging Results              MRI Lumbar Spine Without Contrast (Preliminary result)  Result time 02/20/23 01:43:03      Preliminary result by Hector Randolph MD (02/20/23 01:43:03)                   Narrative:    START OF REPORT:  Technique: Standard axial sagittal and coronal lumbar spine MRI sequences were performed.    Comparison: Comparison is with L-spine radiographs dated 2023-02-19 19:22:03.    Clinical history: Lower back pain.    Findings:  Distal cord and conus medullaris: Spinal cord and conus medullaris are unremarkable.  Cauda equina and intrathecal contents: Cauda equina appears normal. Intrathecal contents are unremarkable.  Anatomy: Unremarkable.  Alignment: Normal vertebral  alignment is seen; no listhesis is identified.  Integrity of the bone, bone marrow and discs:  Bone: Vertebral body heights are maintained.  Bone marrow: Aside from degenerative changes no abnormal marrow signal is identified.  Discs: Severe disc desiccation with loss of disc height at L5-S1. Severe disc desiccation at L4-L; however, the disc height is maintained.  Findings at specific level:  T12- L1: Nerve roots are normal.  L1- L2: Nerve roots are normal.  L2- L3: Nerve roots are normal.  L3- L4: Nerve roots are normal.  L4- L5: Mild diffuse disc bulge at L4-L5 with central HIZ annular tear (series 7, image 7) (HIZ / high intensity zone). Associated mild bilateral facet hypertrophy. No spinal canal or neural foraminal narrowing.  L5- S1: Diffuse discbulge at L5-S1 with 6 mm broad-based central and right paracentral disc extrusion with caudal migration (series 8, images 7 and 8). Mild bilateral facet hypertrophy. Moderate spinal canal narrowing with right subarticular recess narrowing and likely descending intraspinal right S1 nerve root impingement. Moderate right neural foraminal narrowing is seen with likely exiting right L5 nerve root impingement.      Impression:  1. Mild diffuse disc bulge at L4-L5 with central HIZ annular tear (series 7, image 7) (HIZ / high intensity zone). Associated mild bilateral facet hypertrophy. No spinal canal or neural foraminal narrowing.  2. Diffuse discbulge at L5-S1 with 6 mm broad-based central and right paracentral disc extrusion with caudal migration (series 8, images 7 and 8). Mild bilateral facet hypertrophy. Moderate spinal canal narrowing with right subarticular recess narrowing and likely descending intraspinal right S1 nerve root impingement. Moderate right neural foraminal narrowing is seen with likely exiting right L5 nerve root impingement.  3. Details and other findings, as described above.                          Preliminary result by Interface, Rad Results In  (02/20/23 01:43:03)                   Narrative:    START OF REPORT:  Technique: Standard axial sagittal and coronal lumbar spine MRI sequences were performed.    Comparison: Comparison is with L-spine radiographs dated 2023-02-19 19:22:03.    Clinical history: Lower back pain.    Findings:  Distal cord and conus medullaris: Spinal cord and conus medullaris are unremarkable.  Cauda equina and intrathecal contents: Cauda equina appears normal. Intrathecal contents are unremarkable.  Anatomy: Unremarkable.  Alignment: Normal vertebral alignment is seen; no listhesis is identified.  Integrity of the bone, bone marrow and discs:  Bone: Vertebral body heights are maintained.  Bone marrow: Aside from degenerative changes no abnormal marrow signal is identified.  Discs: Severe disc desiccation with loss of disc height at L5-S1. Severe disc desiccation at L4-L; however, the disc height is maintained.  Findings at specific level:  T12- L1: Nerve roots are normal.  L1- L2: Nerve roots are normal.  L2- L3: Nerve roots are normal.  L3- L4: Nerve roots are normal.  L4- L5: Mild diffuse disc bulge at L4-L5 with central HIZ annular tear (series 7, image 7) (HIZ / high intensity zone). Associated mild bilateral facet hypertrophy. No spinal canal or neural foraminal narrowing.  L5- S1: Diffuse discbulge at L5-S1 with 6 mm broad-based central and right paracentral disc extrusion with caudal migration (series 8, images 7 and 8). Mild bilateral facet hypertrophy. Moderate spinal canal narrowing with right subarticular recess narrowing and likely descending intraspinal right S1 nerve root impingement. Moderate right neural foraminal narrowing is seen with likely exiting right L5 nerve root impingement.      Impression:  1. Mild diffuse disc bulge at L4-L5 with central HIZ annular tear (series 7, image 7) (HIZ / high intensity zone). Associated mild bilateral facet hypertrophy. No spinal canal or neural foraminal narrowing.  2. Diffuse  discbulge at L5-S1 with 6 mm broad-based central and right paracentral disc extrusion with caudal migration (series 8, images 7 and 8). Mild bilateral facet hypertrophy. Moderate spinal canal narrowing with right subarticular recess narrowing and likely descending intraspinal right S1 nerve root impingement. Moderate right neural foraminal narrowing is seen with likely exiting right L5 nerve root impingement.  3. Details and other findings, as described above.                                         X-Ray Lumbar Spine 2 Or 3 Views (Final result)  Result time 02/19/23 19:39:17      Final result by Jomar Blackman MD (02/19/23 19:39:17)                   Impression:      As above.      Electronically signed by: Jomar Blackman  Date:    02/19/2023  Time:    19:39               Narrative:    EXAMINATION:  XR LUMBAR SPINE 2 OR 3 VIEWS    CLINICAL HISTORY:  lower back pain;    TECHNIQUE:  Three views of the lumbar spine.    COMPARISON:  None    FINDINGS:  Vertebral body height disc spaces well maintained.  No displaced fracture.  Vascular atherosclerotic disease of the abdominal aorta is noted.                                       Medications   dexAMETHasone injection 8 mg (8 mg Intramuscular Given 2/19/23 1915)   cyclobenzaprine tablet 10 mg (10 mg Oral Given 2/19/23 1915)   ketorolac injection 30 mg (30 mg Intramuscular Given 2/19/23 2025)   HYDROmorphone (PF) injection 1 mg (1 mg Intramuscular Given 2/19/23 2106)   orphenadrine injection 60 mg (60 mg Intramuscular Given 2/19/23 2106)   morphine injection 4 mg (4 mg Intravenous Given 2/19/23 2215)   ondansetron injection 4 mg (4 mg Intravenous Given 2/19/23 2220)   HYDROmorphone (PF) injection 1 mg (1 mg Intravenous Given 2/19/23 2345)     Medical Decision Making:   Initial Assessment:   Historian:  Patient.  Patient is a 58-year-old female  that presents with right sciatica that has been present chronic. Associated symptoms increased pain over the last few days.  Surrounding information is states she has 2 herniated disc. Exacerbated by movement. Relieved by nothing. Patient treatment prior to arrival medications. Risk factors include none. Other history pertaining to this complaint nothing.   Assessment:  See physical exam.    Differential Diagnosis:   Herniated disc, sciatica, piriformis syndrome, psoas muscle spasm, SI joint irritation, cauda equina, vertebral fracture  Clinical Tests:   Radiological Study: Ordered and Reviewed  ED Management:  History was obtained.  Physical was performed. Required multiple doses of IV narcotics as well as muscle relaxants and steroids  Other:   I have discussed this case with another health care provider.       <> Summary of the Discussion: Consults: Dr Espinoza who recs increasing Gabapentin and can give steroids, okay to follow up            ED Course as of 02/20/23 0829 Mon Feb 20, 2023 0227 Updated patient on MRI results. Her pain has improved to 5/10 and she wants to be discharged. She said she has had the annular tear for a few years and sees pain management for her back.  [KM]   8685 Paged Neurosurgery  [DP]   4042 Call and Consult Neurosurgery  [DP]   4434 Spoke with Dr Espinoza who recommends increasing Gabapentin and okay to give course of steroids  [KM]      ED Course User Index  [DP] Meliza Sidhu  [KM] Eneida Logn MD                 Clinical Impression:   Final diagnoses:  [M54.16] Lumbar radiculopathy, acute (Primary)  [M51.36] Annular tear of lumbar disc  [M54.41] Acute right-sided low back pain with right-sided sciatica        ED Disposition Condition    Discharge Stable          ED Prescriptions       Medication Sig Dispense Start Date End Date Auth. Provider    gabapentin (NEURONTIN) 600 MG tablet Take 1 tablet (600 mg total) by mouth 3 (three) times daily. 90 tablet 2/20/2023 2/20/2024 Eneida Long MD    predniSONE (DELTASONE) 50 MG Tab Take 1 tablet (50 mg total) by mouth once daily. for 5 days 5 tablet 2/20/2023  2/25/2023 Eneida Long MD          Follow-up Information       Follow up With Specialties Details Why Contact Info    Santiago Espinoza MD Neurosurgery Schedule an appointment as soon as possible for a visit   [address] [place] [address] [address]  [address]  [facility] [address] [phone]      [facility]  Emergency Dept Emergency Medicine  As needed, If symptoms worsen [facility] [id number] [phone]             Eneida Long MD  02/20/23 0829

## 2023-02-27 DIAGNOSIS — M43.06 LUMBAR SPONDYLOLYSIS: Primary | ICD-10-CM

## 2023-02-27 DIAGNOSIS — M51.26 DISC DISPLACEMENT, LUMBAR: ICD-10-CM

## 2023-03-07 ENCOUNTER — TELEPHONE (OUTPATIENT)
Dept: NEUROSURGERY | Facility: CLINIC | Age: 58
End: 2023-03-07
Payer: COMMERCIAL

## 2023-03-07 DIAGNOSIS — M51.26 DISC DISPLACEMENT, LUMBAR: Primary | ICD-10-CM

## 2023-03-07 NOTE — TELEPHONE ENCOUNTER
She presented to the ED on 2/20/2023.  They contacted Dr. Espinoza.  I can see her Thursday.  She needs lumbar flex/ext.

## 2023-03-07 NOTE — TELEPHONE ENCOUNTER
"----- Message from Rachel Hudson MA sent at 3/6/2023 11:31 AM CST -----  Regarding: REFERRAL PROCESS- lumbar  This patient is being referred to Dr. Saucedo by Dr. Rapp for lumbar spondylolysis. Reviewing MRI report,"  Central/right subarticular disc extrusion at L5-S1 with inferior migration and impingement on the descending/exiting right S1 nerve roots." Please review imaging and advise on scheduling. Thank you.    "

## 2023-03-08 NOTE — TELEPHONE ENCOUNTER
Spoke with patient. Advised of below. Scheduled her with Ivelisse for 3/9/23 at 8:30. Patient is already going to have CT and Xray today at [facility] so we called [facility] and informed them that the xray needs to be with Flex & ext. Patient verbalized understanding. She has not had any recent testing since MRI 2/2023. Denies seeing any other doctors for this besides referring. Denies any surgeries on her neck/back. She is currently doing pain management with Dr. Rapp (in chart).

## 2023-03-09 ENCOUNTER — OFFICE VISIT (OUTPATIENT)
Dept: NEUROSURGERY | Facility: CLINIC | Age: 58
End: 2023-03-09
Payer: COMMERCIAL

## 2023-03-09 ENCOUNTER — TELEPHONE (OUTPATIENT)
Dept: NEUROSURGERY | Facility: CLINIC | Age: 58
End: 2023-03-09

## 2023-03-09 VITALS
HEIGHT: 62 IN | BODY MASS INDEX: 22.45 KG/M2 | SYSTOLIC BLOOD PRESSURE: 117 MMHG | RESPIRATION RATE: 16 BRPM | HEART RATE: 83 BPM | WEIGHT: 122 LBS | DIASTOLIC BLOOD PRESSURE: 68 MMHG

## 2023-03-09 DIAGNOSIS — M43.06 LUMBAR SPONDYLOLYSIS: ICD-10-CM

## 2023-03-09 DIAGNOSIS — M51.26 DISC DISPLACEMENT, LUMBAR: Primary | ICD-10-CM

## 2023-03-09 PROCEDURE — 3074F SYST BP LT 130 MM HG: CPT | Mod: CPTII,,, | Performed by: PHYSICIAN ASSISTANT

## 2023-03-09 PROCEDURE — 1159F MED LIST DOCD IN RCRD: CPT | Mod: CPTII,,, | Performed by: PHYSICIAN ASSISTANT

## 2023-03-09 PROCEDURE — 1159F PR MEDICATION LIST DOCUMENTED IN MEDICAL RECORD: ICD-10-PCS | Mod: CPTII,,, | Performed by: PHYSICIAN ASSISTANT

## 2023-03-09 PROCEDURE — 3008F PR BODY MASS INDEX (BMI) DOCUMENTED: ICD-10-PCS | Mod: CPTII,,, | Performed by: PHYSICIAN ASSISTANT

## 2023-03-09 PROCEDURE — G9016 PR DEMO-SMOKING CESSATION COUN: ICD-10-PCS | Mod: ,,, | Performed by: PHYSICIAN ASSISTANT

## 2023-03-09 PROCEDURE — 99203 OFFICE O/P NEW LOW 30 MIN: CPT | Mod: ,,, | Performed by: PHYSICIAN ASSISTANT

## 2023-03-09 PROCEDURE — 3078F PR MOST RECENT DIASTOLIC BLOOD PRESSURE < 80 MM HG: ICD-10-PCS | Mod: CPTII,,, | Performed by: PHYSICIAN ASSISTANT

## 2023-03-09 PROCEDURE — G9016 DEMO-SMOKING CESSATION COUN: HCPCS | Mod: ,,, | Performed by: PHYSICIAN ASSISTANT

## 2023-03-09 PROCEDURE — 99203 PR OFFICE/OUTPT VISIT, NEW, LEVL III, 30-44 MIN: ICD-10-PCS | Mod: ,,, | Performed by: PHYSICIAN ASSISTANT

## 2023-03-09 PROCEDURE — 3078F DIAST BP <80 MM HG: CPT | Mod: CPTII,,, | Performed by: PHYSICIAN ASSISTANT

## 2023-03-09 PROCEDURE — 1160F PR REVIEW ALL MEDS BY PRESCRIBER/CLIN PHARMACIST DOCUMENTED: ICD-10-PCS | Mod: CPTII,,, | Performed by: PHYSICIAN ASSISTANT

## 2023-03-09 PROCEDURE — 1160F RVW MEDS BY RX/DR IN RCRD: CPT | Mod: CPTII,,, | Performed by: PHYSICIAN ASSISTANT

## 2023-03-09 PROCEDURE — 3008F BODY MASS INDEX DOCD: CPT | Mod: CPTII,,, | Performed by: PHYSICIAN ASSISTANT

## 2023-03-09 PROCEDURE — 3074F PR MOST RECENT SYSTOLIC BLOOD PRESSURE < 130 MM HG: ICD-10-PCS | Mod: CPTII,,, | Performed by: PHYSICIAN ASSISTANT

## 2023-03-09 RX ORDER — PROMETHAZINE HYDROCHLORIDE 50 MG/1
25 TABLET ORAL
COMMUNITY
Start: 2022-12-28

## 2023-03-09 RX ORDER — EZETIMIBE 10 MG/1
10 TABLET ORAL DAILY
COMMUNITY
Start: 2023-02-09

## 2023-03-09 RX ORDER — ICOSAPENT ETHYL 1000 MG/1
1 CAPSULE ORAL 2 TIMES DAILY
Status: ON HOLD | COMMUNITY
Start: 2023-02-08 | End: 2023-03-29 | Stop reason: HOSPADM

## 2023-03-09 RX ORDER — AMLODIPINE BESYLATE 10 MG/1
1 TABLET ORAL DAILY
COMMUNITY
Start: 2022-09-15

## 2023-03-09 RX ORDER — OXYCODONE AND ACETAMINOPHEN 10; 325 MG/1; MG/1
1 TABLET ORAL 3 TIMES DAILY
Status: ON HOLD | COMMUNITY
Start: 2023-02-23 | End: 2023-03-29

## 2023-03-09 RX ORDER — TIZANIDINE 4 MG/1
8 TABLET ORAL NIGHTLY
COMMUNITY
Start: 2023-03-06

## 2023-03-09 RX ORDER — ROSUVASTATIN CALCIUM 20 MG/1
20 TABLET, COATED ORAL DAILY
COMMUNITY
Start: 2023-02-08

## 2023-03-09 RX ORDER — CHOLECALCIFEROL (VITAMIN D3) 25 MCG
1000 TABLET ORAL DAILY
COMMUNITY

## 2023-03-09 RX ORDER — ALBUTEROL SULFATE 0.83 MG/ML
SOLUTION RESPIRATORY (INHALATION) 2 TIMES DAILY
COMMUNITY
Start: 2022-12-07

## 2023-03-09 RX ORDER — AMITRIPTYLINE HYDROCHLORIDE 25 MG/1
25 TABLET, FILM COATED ORAL NIGHTLY
COMMUNITY
Start: 2023-02-23

## 2023-03-09 RX ORDER — PANTOPRAZOLE SODIUM 40 MG/1
40 TABLET, DELAYED RELEASE ORAL DAILY
Status: ON HOLD | COMMUNITY
Start: 2023-01-29 | End: 2023-03-29

## 2023-03-09 NOTE — PROGRESS NOTES
[facility]  History & Physical  Neurosurgery      Roseann Day   [id number]   1965       CHIEF COMPLAINT:  Right leg pain    HPI:  Roseann Day is a 58 y.o. female who presents for neurosurgical evaluation.  The patient has a long history of lower back pain.  She is had pain since 1999.  On 02/20/2023, the patient experienced new onset of right lower extremity pain and numbness after standing up from her recliner.  The pain worsened throughout the day to wear became severe.  She presented to the emergency department later that day due to the severity of her pain.  MRI of the lumbar spine was obtained showing a large extruded fragment on the right at L5-S1.  She was treated medically and released early the next morning.    She is a long-time patient of Dr. Rapp.  She has undergone lumbar epidural steroid injection.  She has seen improvement in the severity of her pain.  It is more tolerable at this time, but still a problem.  Her lower back pain is at its baseline.  Her main complaint is of pain at the right buttock, lateral hip, lateral thigh, lateral lower leg, and plantar foot.  There is numbness along the right lateral lower leg plantar/lateral foot, and 3rd, 4th, and 5th toes.  Subjectively, she does not feel as though she has weakness in either lower extremity.  Her pain is increased with all activity.  Use of heat improves her symptoms.  She is having to restrict her activities secondary to pain.  The patient is not working currently.  In December 2022, she had sinus surgery that resulted in a complication involving a muscle of the eye.  She was wearing an eye patch today.  She is not able to drive secondary to her vision.  She has plans to see a physician in [place] regarding her condition.  The patient denies disturbances in bowel or bladder function.  There is no difficulty with balance.         Past Medical History:   Diagnosis Date    GERD (gastroesophageal reflux  disease)     Hyperlipidemia     Hypertension     Osteoporosis     Sciatica of right side        Past Surgical History:   Procedure Laterality Date    apendix      APPENDECTOMY      CARPAL TUNNEL RELEASE Bilateral     CHOLECYSTECTOMY      COLONOSCOPY  04/04/2019    FUNDOPLICATION, NISSEN, PEDIATRIC      GALLBLADDER SURGERY      HYSTERECTOMY      SINUS SURGERY      TONSILLECTOMY      ULNAR TUNNEL RELEASE Bilateral        Family History   Problem Relation Age of Onset    Heart disease Mother     Diabetes Mother     Colon cancer Neg Hx     Esophageal cancer Neg Hx        Social History     Socioeconomic History    Marital status:    Tobacco Use    Smoking status: Every Day     Types: Vaping with nicotine    Smokeless tobacco: Never   Substance and Sexual Activity    Alcohol use: Yes    Drug use: Never    Sexual activity: Yes       Current Outpatient Medications   Medication Sig Dispense Refill    albuterol (PROVENTIL) 2.5 mg /3 mL (0.083 %) nebulizer solution USE 1 VIAL VIA NEBULIZER EVERY 4 TO 6 HOURS AS NEEDED FOR WHEEZING      amitriptyline (ELAVIL) 25 MG tablet Take 25 mg by mouth every evening.      amLODIPine (NORVASC) 10 MG tablet Take 1 tablet by mouth once daily.      ezetimibe (ZETIA) 10 mg tablet Take 10 mg by mouth Daily.      gabapentin (NEURONTIN) 600 MG tablet Take 1 tablet (600 mg total) by mouth 3 (three) times daily. 90 tablet 1    icosapent ethyL (VASCEPA) 1 gram Cap Take 1 capsule by mouth 2 (two) times daily.      oxyCODONE-acetaminophen (PERCOCET)  mg per tablet Take 1 tablet by mouth 3 (three) times daily.      pantoprazole (PROTONIX) 40 MG tablet Take 40 mg by mouth Daily.      promethazine (PHENERGAN) 50 MG tablet as needed.      rosuvastatin (CRESTOR) 20 MG tablet Take 20 mg by mouth Daily.      tiZANidine (ZANAFLEX) 4 MG tablet Take 8 mg by mouth every evening.      vitamin D (VITAMIN D3) 1000 units Tab Take 1,000 Units by mouth once daily.      vitamin E 100 UNIT capsule Take 100  Units by mouth once daily.       No current facility-administered medications for this visit.       Review of patient's allergies indicates:  No Known Allergies     Medication List with Changes/Refills   Current Medications    ALBUTEROL (PROVENTIL) 2.5 MG /3 ML (0.083 %) NEBULIZER SOLUTION    USE 1 VIAL VIA NEBULIZER EVERY 4 TO 6 HOURS AS NEEDED FOR WHEEZING    AMITRIPTYLINE (ELAVIL) 25 MG TABLET    Take 25 mg by mouth every evening.    AMLODIPINE (NORVASC) 10 MG TABLET    Take 1 tablet by mouth once daily.    EZETIMIBE (ZETIA) 10 MG TABLET    Take 10 mg by mouth Daily.    GABAPENTIN (NEURONTIN) 600 MG TABLET    Take 1 tablet (600 mg total) by mouth 3 (three) times daily.    ICOSAPENT ETHYL (VASCEPA) 1 GRAM CAP    Take 1 capsule by mouth 2 (two) times daily.    OXYCODONE-ACETAMINOPHEN (PERCOCET)  MG PER TABLET    Take 1 tablet by mouth 3 (three) times daily.    PANTOPRAZOLE (PROTONIX) 40 MG TABLET    Take 40 mg by mouth Daily.    PROMETHAZINE (PHENERGAN) 50 MG TABLET    as needed.    ROSUVASTATIN (CRESTOR) 20 MG TABLET    Take 20 mg by mouth Daily.    TIZANIDINE (ZANAFLEX) 4 MG TABLET    Take 8 mg by mouth every evening.    VITAMIN D (VITAMIN D3) 1000 UNITS TAB    Take 1,000 Units by mouth once daily.    VITAMIN E 100 UNIT CAPSULE    Take 100 Units by mouth once daily.       ROS:    Review of Systems   Constitutional:  Negative for chills and fever.   HENT:  Negative for nosebleeds and sore throat.    Eyes:  Positive for visual disturbance. Negative for pain.   Respiratory:  Negative for cough, chest tightness and shortness of breath.    Cardiovascular:  Negative for chest pain.   Gastrointestinal:  Negative for diarrhea, nausea and vomiting.   Genitourinary:  Negative for difficulty urinating, dysuria and hematuria.   Musculoskeletal:  Positive for back pain and neck pain. Negative for gait problem and myalgias.   Skin:  Negative for rash.   Neurological:  Positive for numbness. Negative for dizziness, facial  "asymmetry, weakness and headaches.   Psychiatric/Behavioral:  Negative for confusion and sleep disturbance. The patient is not nervous/anxious.        Physical Examination:    Vital Signs:  /68 (BP Location: Right arm, Patient Position: Sitting)   Pulse 83   Resp 16   Ht 5' 2" (1.575 m)   Wt 55.3 kg (122 lb)   BMI 22.31 kg/m²      General:  Pleasant. Well-nourished. Well-groomed.    CV:  regular rate and rhythm    Lungs:  clear to auscultation bilaterally    Abdomen:  Soft, non-tender, non-distended    Musculoskeletal:   Lumbar ROM:  Mildly limited with forward flexion.  Pain at the right lateral lower leg is increased with flexion.  Straight leg raise is positive on the right at 90°.  Crossed straight leg raise is negative.  Hip rotation is negative bilaterally.    Neurological:  Muscle strength against resistance:    Iliopsoas Quadriceps Knee  Flexion Tibialis  anterior Gastro- cnemius EHL   Lower: R 5/5 5/5 5/5 5/5 5/5 5-/5    L 5/5 5/5 5/5 5/5 5/5 5/5   Sensation is intact to primary modalities in bilateral lower extremities with the exception of being diminished along the right L5 and S1 dermatomes.  Babinski: negative (both)  Clonus: negative (both)  Reflexes:   Right  Left    Patellar 2+ 2+   Achilles 0 0   Gait:  Antalgic  She is able to walk on heels and toes without difficulty.      Imaging:  Lumbar x-rays were obtained on 03/08/2023.  Alignment is normal.  There is no abnormal motion with flexion or extension.  There is disc space narrowing at L5-S1.  MRI of the lumbar spine was obtained on 02/20/2023.  There is a large right paracentral disc herniation with extruded fragment.  There is impingement on the descending right S1 nerve root.      ASSESSMENT/PLAN:     1. Disc displacement, lumbar  Ambulatory referral/consult to Neurosurgery      2. Lumbar spondylolysis  Ambulatory referral/consult to Neurosurgery        Options were discussed at length with the patient.  She is already taking " gabapentin.  She is not interested in physical therapy because it has not helped her in the past.  We discussed use of an inversion table.  She will look into this.  Her activities greatly limited secondary to her pain.  She would like to explore surgical options.  I will discuss the patient with Dr. Saucedo.  We will have her return to see Dr. Saucedo soon.      A total of 28 minutes was spent face-to-face with the patient during this encounter.  Over half of that time was spent on counseling and coordination of care.  In addition 20+ minutes were used to reviewed the patient's chart including notes from Dr. Rapp, emergency department notes, lumbar MRI and x-ray images and report, and work on office note.

## 2023-03-09 NOTE — TELEPHONE ENCOUNTER
Dr. Saucedo.  This patient has had a long history of LBP.  On 2/20/2023, she experienced new onset of right leg pain after standing from a recliner.  The pain worsened through the day prompting an emergency room visit.  She was found to have a large extruded fragment on the right at L5-S1.  He is a patient of Dr. Rapp.  She underwent epidural injection which has made the pain more tolerable.  She rates her pain today at 7/10.  She is interested in microdiskectomy.    She does not care to go to physical therapy because it has not helped her in the past.  We discussed inversion table.  She has a history of smoking.  She resumed smoking 3 years ago and currently vapes.  Can you see her Wednesday or Friday of next week?

## 2023-03-12 NOTE — TELEPHONE ENCOUNTER
I am requesting that Ms. Day be contacted by one of the neurosurgery staff members.    I am happy to see this patient for a neurosurgical evaluation on an ASAP basis.    I had a surgery cancel on Wednesday, 03/15/2023.  Therefore, I would like Ms. Day to be scheduled for a 45 minute follow up patient evaluation on this particular date at 1:00 PM.      It is highly likely that I will be in surgery on the morning of 03/15/2023, as I am on-call earlier during the week.  Ms. Day needs to be informed that this is not a normal clinic day for me and that her appointment may need to be changed to Friday, 03/17/2023 in the morning, which is also not a normal clinic day, should an urgent neurosurgical patient situation arise.     Thank you!

## 2023-03-13 NOTE — TELEPHONE ENCOUNTER
I called and scheduled the pt on 3-15-23 @ 1:00 per Dr. Saucedo's instruction. I did notify the patient that this is not a normal clinic day and that Dr. Saucedo would be on call so if any emergent cases should come through the ER, her appt would be subject to change. The patient verbalized understanding.

## 2023-03-15 ENCOUNTER — OFFICE VISIT (OUTPATIENT)
Dept: NEUROSURGERY | Facility: CLINIC | Age: 58
End: 2023-03-15
Payer: COMMERCIAL

## 2023-03-15 VITALS
DIASTOLIC BLOOD PRESSURE: 76 MMHG | WEIGHT: 125.38 LBS | HEIGHT: 62 IN | HEART RATE: 84 BPM | SYSTOLIC BLOOD PRESSURE: 137 MMHG | RESPIRATION RATE: 16 BRPM | BODY MASS INDEX: 23.07 KG/M2

## 2023-03-15 DIAGNOSIS — Z01.818 PREOPERATIVE TESTING: ICD-10-CM

## 2023-03-15 DIAGNOSIS — I10 HYPERTENSION, UNSPECIFIED TYPE: ICD-10-CM

## 2023-03-15 DIAGNOSIS — D68.9 COAGULATION DEFECT: ICD-10-CM

## 2023-03-15 DIAGNOSIS — M51.26 DISC DISPLACEMENT, LUMBAR: Primary | ICD-10-CM

## 2023-03-15 DIAGNOSIS — M43.06 LUMBAR SPONDYLOLYSIS: ICD-10-CM

## 2023-03-15 DIAGNOSIS — K21.9 GASTROESOPHAGEAL REFLUX DISEASE, UNSPECIFIED WHETHER ESOPHAGITIS PRESENT: ICD-10-CM

## 2023-03-15 PROCEDURE — 99214 PR OFFICE/OUTPT VISIT, EST, LEVL IV, 30-39 MIN: ICD-10-PCS | Mod: ,,, | Performed by: NEUROLOGICAL SURGERY

## 2023-03-15 PROCEDURE — 1159F PR MEDICATION LIST DOCUMENTED IN MEDICAL RECORD: ICD-10-PCS | Mod: CPTII,,, | Performed by: NEUROLOGICAL SURGERY

## 2023-03-15 PROCEDURE — 1160F RVW MEDS BY RX/DR IN RCRD: CPT | Mod: CPTII,,, | Performed by: NEUROLOGICAL SURGERY

## 2023-03-15 PROCEDURE — 1159F MED LIST DOCD IN RCRD: CPT | Mod: CPTII,,, | Performed by: NEUROLOGICAL SURGERY

## 2023-03-15 PROCEDURE — 3078F DIAST BP <80 MM HG: CPT | Mod: CPTII,,, | Performed by: NEUROLOGICAL SURGERY

## 2023-03-15 PROCEDURE — 3075F SYST BP GE 130 - 139MM HG: CPT | Mod: CPTII,,, | Performed by: NEUROLOGICAL SURGERY

## 2023-03-15 PROCEDURE — 1160F PR REVIEW ALL MEDS BY PRESCRIBER/CLIN PHARMACIST DOCUMENTED: ICD-10-PCS | Mod: CPTII,,, | Performed by: NEUROLOGICAL SURGERY

## 2023-03-15 PROCEDURE — 3075F PR MOST RECENT SYSTOLIC BLOOD PRESS GE 130-139MM HG: ICD-10-PCS | Mod: CPTII,,, | Performed by: NEUROLOGICAL SURGERY

## 2023-03-15 PROCEDURE — 99214 OFFICE O/P EST MOD 30 MIN: CPT | Mod: ,,, | Performed by: NEUROLOGICAL SURGERY

## 2023-03-15 PROCEDURE — 3078F PR MOST RECENT DIASTOLIC BLOOD PRESSURE < 80 MM HG: ICD-10-PCS | Mod: CPTII,,, | Performed by: NEUROLOGICAL SURGERY

## 2023-03-15 PROCEDURE — 3008F BODY MASS INDEX DOCD: CPT | Mod: CPTII,,, | Performed by: NEUROLOGICAL SURGERY

## 2023-03-15 PROCEDURE — 3008F PR BODY MASS INDEX (BMI) DOCUMENTED: ICD-10-PCS | Mod: CPTII,,, | Performed by: NEUROLOGICAL SURGERY

## 2023-03-15 RX ORDER — ONDANSETRON HYDROCHLORIDE 8 MG/1
8 TABLET, FILM COATED ORAL EVERY 8 HOURS PRN
COMMUNITY
Start: 2023-01-30

## 2023-03-15 NOTE — PROGRESS NOTES
[facility]   Neurosurgery      Roseann Day  MRN: [id number], CSN: [id number]      : 1965   Age: 58 y.o. female  Payor: [facility] / Plan: [place] [place] P[place] / Product Type: PPO /       Ref:  No referring provider defined for this encounter.    PCP: Sawyer Anna II, MD    Visit Date: 3/15/2023     There is no problem list on file for this patient.      SUBJECTIVE:      CC:   Chief Complaint   Patient presents with    Low back and right leg pain       HPI:   Ms. Day is a 58 y.o. female who has a past medical history significant for hypertension, GERD, chronic low back pain, and osteoporosis.  She presents as a follow-up patient in the neurosurgery clinic for new onset of right leg pain and numbness for a duration of 4 weeks after standing up from a recliner.    The patient's last visit in the neurosurgery clinic was on 2023 with MARIXA Hatch.  Ms. Day was also evaluated in the ER on 2023 when she had acute onset of right leg pain and numbness.  A MRI lumbar spine without gadolinium demonstrated a large disc herniation on the right at L5-S1.    Ms. Day localizes her pain to the right lumbosacral region with radiation down her right posterior leg down to her foot.  There is numbness in the lateral aspect of her right lower leg, two lateral toes, and sole of right foot.  She has not had any weakness, saddle anesthesia, or bowel/ bladder incontinence.  The patient has been fully ambulatory.    She has been taking Percocet since the onset of these symptoms.  Percocet is prescribed by her established pain management specialist Dr. Rapp who has completed serial lumbar epidural steroid injections for her low back pain and recently completed a lumbar epidural steroid injection 2 weeks ago.  Ms. Day reports that this most recent steroid injection partially reduced her pain level.  She also takes Neurontin, Elavil, and Zanaflex.  The patient has  not participated in physical therapy as she has not felt that PT has been beneficial in the past.      It is noted that the patient had sinus surgery in December 2022 with a complication involving her R eye muscle.  She is wearing an eye patch and has been visiting with eye specialists in [place], [place].      There are no problems to display for this patient.    Past Medical History:   Diagnosis Date    GERD (gastroesophageal reflux disease)     Hyperlipidemia     Hypertension     Osteoporosis     Sciatica of right side      Past Surgical History:   Procedure Laterality Date    apendix      APPENDECTOMY      CARPAL TUNNEL RELEASE Bilateral     CHOLECYSTECTOMY      COLONOSCOPY  04/04/2019    FUNDOPLICATION, NISSEN, PEDIATRIC      GALLBLADDER SURGERY      HYSTERECTOMY      SINUS SURGERY      TONSILLECTOMY      ULNAR TUNNEL RELEASE Bilateral        Current Outpatient Medications:     albuterol (PROVENTIL) 2.5 mg /3 mL (0.083 %) nebulizer solution, USE 1 VIAL VIA NEBULIZER EVERY 4 TO 6 HOURS AS NEEDED FOR WHEEZING, Disp: , Rfl:     amitriptyline (ELAVIL) 25 MG tablet, Take 25 mg by mouth every evening., Disp: , Rfl:     amLODIPine (NORVASC) 10 MG tablet, Take 1 tablet by mouth once daily., Disp: , Rfl:     ezetimibe (ZETIA) 10 mg tablet, Take 10 mg by mouth Daily., Disp: , Rfl:     gabapentin (NEURONTIN) 600 MG tablet, Take 1 tablet (600 mg total) by mouth 3 (three) times daily., Disp: 90 tablet, Rfl: 1    icosapent ethyL (VASCEPA) 1 gram Cap, Take 1 capsule by mouth 2 (two) times daily., Disp: , Rfl:     ondansetron (ZOFRAN) 8 MG tablet, Take 8 mg by mouth., Disp: , Rfl:     oxyCODONE-acetaminophen (PERCOCET)  mg per tablet, Take 1 tablet by mouth 3 (three) times daily., Disp: , Rfl:     pantoprazole (PROTONIX) 40 MG tablet, Take 40 mg by mouth Daily., Disp: , Rfl:     promethazine (PHENERGAN) 50 MG tablet, as needed., Disp: , Rfl:     rosuvastatin (CRESTOR) 20 MG tablet, Take 20 mg by mouth Daily., Disp: , Rfl:  "    tiZANidine (ZANAFLEX) 4 MG tablet, Take 8 mg by mouth every evening., Disp: , Rfl:     vitamin D (VITAMIN D3) 1000 units Tab, Take 1,000 Units by mouth once daily., Disp: , Rfl:     vitamin E 100 UNIT capsule, Take 100 Units by mouth once daily., Disp: , Rfl:     Review of patient's allergies indicates:  No Known Allergies    Social History     Tobacco Use    Smoking status: Every Day     Types: Vaping with nicotine    Smokeless tobacco: Never   Substance Use Topics    Alcohol use: Yes     Occupation:  Last worked in December 2022 before having sinus surgery.    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     Family History   Problem Relation Age of Onset    Heart disease Mother     Diabetes Mother     Colon cancer Neg Hx     Esophageal cancer Neg Hx        ROS:  Constitutional:  Negative for chills and fever.   HENT:  Negative for congestion and sore throat.    Eyes:  Positive for blurred vision and double vision.   Respiratory:  Negative for cough and shortness of breath.    Cardiovascular:  Negative for chest pain and palpitations.   Gastrointestinal:  Negative for constipation, diarrhea, nausea and vomiting.   Musculoskeletal:  Positive for back pain, Negative for neck pain.   Neurological:  Positive for sensory change, Negative for focal weakness and headaches.   Endo/Heme/Allergies:  Does not bruise/bleed easily.   Psychiatric/Behavioral:  Negative for depression and anxiety.      OBJECTIVE:     EXAMINATION:  /76   Pulse 84   Resp 16   Ht 5' 2" (1.575 m)   Wt 56.9 kg (125 lb 6.4 oz)   BMI 22.94 kg/m²   Body Habitus: Thin    Physical Exam:  Constitutional: The patient is well-developed and cooperative, sitting comfortably in a chair    HEENT: R eye patch in place, L pupil is 3 to 2 mm, briskly reactive  Cardiovascular:  regular rate and rhythm with no murmurs  Lungs: clear to auscultation bilateral  Abdomen:  soft, nontender, nondistended    Mental Status:   Oriented to " person, place, and time  Normal speech    Motor:  Muscle bulk: normal in all extremities  Tone: normal in all extremities    Upper extremities:  Deltoid: right 5/5; left 5/5  Biceps: right 5/5; left 5/5  Triceps: right 5/5; left 5/5  : right 5/5; left 5/5  Interosseous muscles: right 5/5; left 5/5    Lower extremities:  Hip flexors: right 5/5; left 5/5  Knee extensors: right 5/5; left 5/5  Knee flexors: right 5/5; left 5/5  Foot dorsiflexors: right 5/5; left 5/5  Foot plantar flexors: right 5/5; left 5/5  Extensor hallucis longus: right 5/5; left 5/5    Sensation:  Normal to light touch x 4 extremities,  except decreased to light touch in right lateral lower leg, right lateral foot and sole    Reflexes:  Butts sign: right negative; left negative  Babinski: right downgoing; left downgoing  Clonus: right negative; left negative    Musculoskeletal:    Gait:  Toe walk- normal  Heel walk- normal  Tandem gait- normal    Straight leg test: right positive; left negative    Upper back: No pain with palpation  Lower back: Moderate pain with palpation in right lumbosacral region      DIAGNOSTICS REVIEW OF IMAGING, LAB & OTHER STUDIES:  I have personally reviewed and evaluated the following reports as well as radiographic studies:    Lumbar spine x-rays, 03/08/2023- normal alignment with degenerative disc disease at L5-S1.  There is no motion on flexion-extension views.    MRI lumbar spine without gadolinium, 02/20/2022- there is normal alignment.  Large right L5-S1 disc herniation with neuroforaminal narrowing.  The disc extends behind S1.       ASSESSMENT:  Ms. Day is a 58 y.o. female who has a past medical history significant for hypertension, GERD, chronic low back pain, and osteoporosis.  She presents as a follow-up patient in the neurosurgery clinic for new onset of right leg pain and numbness for a duration of 4 weeks after standing up from a recliner.  The patient has a normal motor exam with decreased  sensation in the right S1 dermatome along her right lateral lower leg and foot.    I reviewed pertinent imaging studies with the patient.  A MRI lumbar spine without gadolinium demonstrates normal alignment.  Large right L5-S1 disc herniation with neuroforaminal narrowing.  The disc extends behind S1.         PLAN:    Encounter Diagnoses   Name Primary?    Disc displacement, lumbar Yes    Lumbar spondylolysis     Preoperative testing     Gastroesophageal reflux disease, unspecified whether esophagitis present     Hypertension, unspecified type     Coagulation defect      Orders Placed This Encounter   Procedures    X-Ray Chest PA And Lateral    Protime-INR    APTT    CBC Auto Differential    Comprehensive Metabolic Panel    Urinalysis    EKG 12-lead        1.  I discussed with Ms. Day that her symptoms of right lower extremity radiculopathy have not significantly improved with optimization of medical management.  Her right leg pain and numbness has been disruptive to her daily activities.  Therefore, I recommend surgery, specifically a right L5-S1 diskectomy.  She understands that this surgery is anticipated to improve her right leg pain, but is not expected to change her chronic low back pain.   I reviewed the risks, benefits, and alternatives of this surgery.  The patient agrees to these risks and would like to proceed.  All questions were answered to her satisfaction.      2.  A preoperative assessment will be completed today.  She has signed a surgical consent form.    3.  The patient is being scheduled for a right L5-S1 diskectomy on Wednesday, 03/29/2023 at 7:30 AM.    4.  We discussed the goal to become nicotine free, as nicotine inhibits tissue healing.          The risks, benefits, and alternatives to surgery were discussed with the patient.     Complications of a Lumbar Discectomy may include:  Failure to improve symptoms and/or increased or persistent pain; Recurrence, continuation, or worsening of  the condition that required the operation; Need for further surgical intervention or treatment; Neurological injury, which may include spinal cord or nerve root injury, paralysis (which involves the inability to move arms and/or legs), clumsiness, loss of sympathetic response, loss of sensation, and loss of bowel, bladder, and sexual function; Residual or recurrent disc herniation; Delayed or immediate spinal instability; Cerebral spinal fluid leak; Meningitis; Damage to major blood vessels, nerves, and surrounding anatomical structures; Scarring; Blindness; and Positioning problems such as neuropathy or compartment syndrome.    Complications of any surgery may include:  Adverse reaction to anesthesia; Bleeding; Transfusion of blood products, which carries a risk of infection or reaction; Infection, which requires treatment with antibiotics by mouth or intravenously, or even further surgeries; Urinary tract infection; Heart attack, stroke, pneumonia, and DVT/PE (blood clot in the legs or pelvis that can dislodge and go to the lungs); Other unforeseen complications; Coma; and Death.    Benefits of surgery include:  Possible improvement in buttocks and leg pain, numbness, and/or weakness; and possible improvement in back pain.    Alternatives to the procedure include:  Physical therapy, chiropractic care, acupuncture, medical therapy, and pain management.       LUMBAR LAMINECTOMY/ LAMINOTOMY/ DISCECTOMY  DISCHARGE INSTRUCTIONS      1.   Keep your incision clean and dry.    If your sutures are under the skin, they will dissolve with time.  If your sutures or staples are over the skin, they will be removed at your first postoperative follow-up appointment in 10-14 days.   Wait at least 72 hours from the time of your surgery to take a shower.  After showering, pat your incision dry.  Do not immerse your incision in water for 4-6 weeks (e.g. bath tub, hot tub, swimming pool).      2.  Activity restrictions:  No lifting  greater than 15 pounds for 4-6 weeks.  No bending, stooping, or twisting.  Avoid sitting in one position for over 30 minutes at a time.  No impact exercise or contact sports for at least 3 months.  To resume driving short distances (<30 minutes), you must be off of your narcotic medications and be able to comfortably brake suddenly, should the need arise.    Get up and walk.      3.  Contact your Neurosurgeon if the following occurs:  Signs and symptoms of infection, including fever above 101.5 degrees Fahrenheit and/or chills.  Redness, swelling, warmth, or drainage from the incision.  Any lasting changes in sensation, numbness, and/or tingling.  Increased weakness or increased pain.  Swelling of the foot and/or lower leg with calf pain.      Neurosurgery has office hours Monday through Friday, 8:00 AM to 4:00 PM except for holidays. There is an answering service available during non-office hours, with 24 hours neurosurgery coverage.  Report to the Emergency Department if you need immediate medical assistance.      Please contact Dr. Saucedo's office for any questions or concerns.  Typically, your first follow-up appointment after a lumbar laminectomy/ laminotomy/ disectomy is 10-14 days from the date of your operation if sutures or staples need to be removed.  Otherwise, the follow-up appointment is in 4-6 weeks.        This note will be sent to the patient's referring provider No ref. provider found and primary care provider Sawyer Anna II, MD.           Luisa Saucedo MD  Neurosurgeon

## 2023-03-15 NOTE — H&P (VIEW-ONLY)
[facility]   Neurosurgery      Roseann Day  MRN: [id number], CSN: [id number]      : 1965   Age: 58 y.o. female  Payor: [facility] / Plan: [place] P[place] / Product Type: PPO /       Ref:  No referring provider defined for this encounter.    PCP: Sawyer Anna II, MD    Visit Date: 3/15/2023     There is no problem list on file for this patient.      SUBJECTIVE:      CC:   Chief Complaint   Patient presents with    Low back and right leg pain       HPI:   Ms. Day is a 58 y.o. female who has a past medical history significant for hypertension, GERD, chronic low back pain, and osteoporosis.  She presents as a follow-up patient in the neurosurgery clinic for new onset of right leg pain and numbness for a duration of 4 weeks after standing up from a recliner.    The patient's last visit in the neurosurgery clinic was on 2023 with MARIXA Hatch.  Ms. Day was also evaluated in the ER on 2023 when she had acute onset of right leg pain and numbness.  A MRI lumbar spine without gadolinium demonstrated a large disc herniation on the right at L5-S1.    Ms. Day localizes her pain to the right lumbosacral region with radiation down her right posterior leg down to her foot.  There is numbness in the lateral aspect of her right lower leg, two lateral toes, and sole of right foot.  She has not had any weakness, saddle anesthesia, or bowel/ bladder incontinence.  The patient has been fully ambulatory.    She has been taking Percocet since the onset of these symptoms.  Percocet is prescribed by her established pain management specialist Dr. Rapp who has completed serial lumbar epidural steroid injections for her low back pain and recently completed a lumbar epidural steroid injection 2 weeks ago.  Ms. Day reports that this most recent steroid injection partially reduced her pain level.  She also takes Neurontin, Elavil, and Zanaflex.  The patient has  not participated in physical therapy as she has not felt that PT has been beneficial in the past.      It is noted that the patient had sinus surgery in December 2022 with a complication involving her R eye muscle.  She is wearing an eye patch and has been visiting with eye specialists in [place], [place].      There are no problems to display for this patient.    Past Medical History:   Diagnosis Date    GERD (gastroesophageal reflux disease)     Hyperlipidemia     Hypertension     Osteoporosis     Sciatica of right side      Past Surgical History:   Procedure Laterality Date    apendix      APPENDECTOMY      CARPAL TUNNEL RELEASE Bilateral     CHOLECYSTECTOMY      COLONOSCOPY  04/04/2019    FUNDOPLICATION, NISSEN, PEDIATRIC      GALLBLADDER SURGERY      HYSTERECTOMY      SINUS SURGERY      TONSILLECTOMY      ULNAR TUNNEL RELEASE Bilateral        Current Outpatient Medications:     albuterol (PROVENTIL) 2.5 mg /3 mL (0.083 %) nebulizer solution, USE 1 VIAL VIA NEBULIZER EVERY 4 TO 6 HOURS AS NEEDED FOR WHEEZING, Disp: , Rfl:     amitriptyline (ELAVIL) 25 MG tablet, Take 25 mg by mouth every evening., Disp: , Rfl:     amLODIPine (NORVASC) 10 MG tablet, Take 1 tablet by mouth once daily., Disp: , Rfl:     ezetimibe (ZETIA) 10 mg tablet, Take 10 mg by mouth Daily., Disp: , Rfl:     gabapentin (NEURONTIN) 600 MG tablet, Take 1 tablet (600 mg total) by mouth 3 (three) times daily., Disp: 90 tablet, Rfl: 1    icosapent ethyL (VASCEPA) 1 gram Cap, Take 1 capsule by mouth 2 (two) times daily., Disp: , Rfl:     ondansetron (ZOFRAN) 8 MG tablet, Take 8 mg by mouth., Disp: , Rfl:     oxyCODONE-acetaminophen (PERCOCET)  mg per tablet, Take 1 tablet by mouth 3 (three) times daily., Disp: , Rfl:     pantoprazole (PROTONIX) 40 MG tablet, Take 40 mg by mouth Daily., Disp: , Rfl:     promethazine (PHENERGAN) 50 MG tablet, as needed., Disp: , Rfl:     rosuvastatin (CRESTOR) 20 MG tablet, Take 20 mg by mouth Daily., Disp: , Rfl:  "    tiZANidine (ZANAFLEX) 4 MG tablet, Take 8 mg by mouth every evening., Disp: , Rfl:     vitamin D (VITAMIN D3) 1000 units Tab, Take 1,000 Units by mouth once daily., Disp: , Rfl:     vitamin E 100 UNIT capsule, Take 100 Units by mouth once daily., Disp: , Rfl:     Review of patient's allergies indicates:  No Known Allergies    Social History     Tobacco Use    Smoking status: Every Day     Types: Vaping with nicotine    Smokeless tobacco: Never   Substance Use Topics    Alcohol use: Yes     Occupation:  Last worked in December 2022 before having sinus surgery.    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     Family History   Problem Relation Age of Onset    Heart disease Mother     Diabetes Mother     Colon cancer Neg Hx     Esophageal cancer Neg Hx        ROS:  Constitutional:  Negative for chills and fever.   HENT:  Negative for congestion and sore throat.    Eyes:  Positive for blurred vision and double vision.   Respiratory:  Negative for cough and shortness of breath.    Cardiovascular:  Negative for chest pain and palpitations.   Gastrointestinal:  Negative for constipation, diarrhea, nausea and vomiting.   Musculoskeletal:  Positive for back pain, Negative for neck pain.   Neurological:  Positive for sensory change, Negative for focal weakness and headaches.   Endo/Heme/Allergies:  Does not bruise/bleed easily.   Psychiatric/Behavioral:  Negative for depression and anxiety.      OBJECTIVE:     EXAMINATION:  /76   Pulse 84   Resp 16   Ht 5' 2" (1.575 m)   Wt 56.9 kg (125 lb 6.4 oz)   BMI 22.94 kg/m²   Body Habitus: Thin    Physical Exam:  Constitutional: The patient is well-developed and cooperative, sitting comfortably in a chair    HEENT: R eye patch in place, L pupil is 3 to 2 mm, briskly reactive  Cardiovascular:  regular rate and rhythm with no murmurs  Lungs: clear to auscultation bilateral  Abdomen:  soft, nontender, nondistended    Mental Status:   Oriented to " person, place, and time  Normal speech    Motor:  Muscle bulk: normal in all extremities  Tone: normal in all extremities    Upper extremities:  Deltoid: right 5/5; left 5/5  Biceps: right 5/5; left 5/5  Triceps: right 5/5; left 5/5  : right 5/5; left 5/5  Interosseous muscles: right 5/5; left 5/5    Lower extremities:  Hip flexors: right 5/5; left 5/5  Knee extensors: right 5/5; left 5/5  Knee flexors: right 5/5; left 5/5  Foot dorsiflexors: right 5/5; left 5/5  Foot plantar flexors: right 5/5; left 5/5  Extensor hallucis longus: right 5/5; left 5/5    Sensation:  Normal to light touch x 4 extremities,  except decreased to light touch in right lateral lower leg, right lateral foot and sole    Reflexes:  Butts sign: right negative; left negative  Babinski: right downgoing; left downgoing  Clonus: right negative; left negative    Musculoskeletal:    Gait:  Toe walk- normal  Heel walk- normal  Tandem gait- normal    Straight leg test: right positive; left negative    Upper back: No pain with palpation  Lower back: Moderate pain with palpation in right lumbosacral region      DIAGNOSTICS REVIEW OF IMAGING, LAB & OTHER STUDIES:  I have personally reviewed and evaluated the following reports as well as radiographic studies:    Lumbar spine x-rays, 03/08/2023- normal alignment with degenerative disc disease at L5-S1.  There is no motion on flexion-extension views.    MRI lumbar spine without gadolinium, 02/20/2022- there is normal alignment.  Large right L5-S1 disc herniation with neuroforaminal narrowing.  The disc extends behind S1.       ASSESSMENT:  Ms. Day is a 58 y.o. female who has a past medical history significant for hypertension, GERD, chronic low back pain, and osteoporosis.  She presents as a follow-up patient in the neurosurgery clinic for new onset of right leg pain and numbness for a duration of 4 weeks after standing up from a recliner.  The patient has a normal motor exam with decreased  sensation in the right S1 dermatome along her right lateral lower leg and foot.    I reviewed pertinent imaging studies with the patient.  A MRI lumbar spine without gadolinium demonstrates normal alignment.  Large right L5-S1 disc herniation with neuroforaminal narrowing.  The disc extends behind S1.         PLAN:    Encounter Diagnoses   Name Primary?    Disc displacement, lumbar Yes    Lumbar spondylolysis     Preoperative testing     Gastroesophageal reflux disease, unspecified whether esophagitis present     Hypertension, unspecified type     Coagulation defect      Orders Placed This Encounter   Procedures    X-Ray Chest PA And Lateral    Protime-INR    APTT    CBC Auto Differential    Comprehensive Metabolic Panel    Urinalysis    EKG 12-lead        1.  I discussed with Ms. Day that her symptoms of right lower extremity radiculopathy have not significantly improved with optimization of medical management.  Her right leg pain and numbness has been disruptive to her daily activities.  Therefore, I recommend surgery, specifically a right L5-S1 diskectomy.  She understands that this surgery is anticipated to improve her right leg pain, but is not expected to change her chronic low back pain.   I reviewed the risks, benefits, and alternatives of this surgery.  The patient agrees to these risks and would like to proceed.  All questions were answered to her satisfaction.      2.  A preoperative assessment will be completed today.  She has signed a surgical consent form.    3.  The patient is being scheduled for a right L5-S1 diskectomy on Wednesday, 03/29/2023 at 7:30 AM.    4.  We discussed the goal to become nicotine free, as nicotine inhibits tissue healing.          The risks, benefits, and alternatives to surgery were discussed with the patient.     Complications of a Lumbar Discectomy may include:  Failure to improve symptoms and/or increased or persistent pain; Recurrence, continuation, or worsening of  the condition that required the operation; Need for further surgical intervention or treatment; Neurological injury, which may include spinal cord or nerve root injury, paralysis (which involves the inability to move arms and/or legs), clumsiness, loss of sympathetic response, loss of sensation, and loss of bowel, bladder, and sexual function; Residual or recurrent disc herniation; Delayed or immediate spinal instability; Cerebral spinal fluid leak; Meningitis; Damage to major blood vessels, nerves, and surrounding anatomical structures; Scarring; Blindness; and Positioning problems such as neuropathy or compartment syndrome.    Complications of any surgery may include:  Adverse reaction to anesthesia; Bleeding; Transfusion of blood products, which carries a risk of infection or reaction; Infection, which requires treatment with antibiotics by mouth or intravenously, or even further surgeries; Urinary tract infection; Heart attack, stroke, pneumonia, and DVT/PE (blood clot in the legs or pelvis that can dislodge and go to the lungs); Other unforeseen complications; Coma; and Death.    Benefits of surgery include:  Possible improvement in buttocks and leg pain, numbness, and/or weakness; and possible improvement in back pain.    Alternatives to the procedure include:  Physical therapy, chiropractic care, acupuncture, medical therapy, and pain management.       LUMBAR LAMINECTOMY/ LAMINOTOMY/ DISCECTOMY  DISCHARGE INSTRUCTIONS      1.   Keep your incision clean and dry.    If your sutures are under the skin, they will dissolve with time.  If your sutures or staples are over the skin, they will be removed at your first postoperative follow-up appointment in 10-14 days.   Wait at least 72 hours from the time of your surgery to take a shower.  After showering, pat your incision dry.  Do not immerse your incision in water for 4-6 weeks (e.g. bath tub, hot tub, swimming pool).      2.  Activity restrictions:  No lifting  greater than 15 pounds for 4-6 weeks.  No bending, stooping, or twisting.  Avoid sitting in one position for over 30 minutes at a time.  No impact exercise or contact sports for at least 3 months.  To resume driving short distances (<30 minutes), you must be off of your narcotic medications and be able to comfortably brake suddenly, should the need arise.    Get up and walk.      3.  Contact your Neurosurgeon if the following occurs:  Signs and symptoms of infection, including fever above 101.5 degrees Fahrenheit and/or chills.  Redness, swelling, warmth, or drainage from the incision.  Any lasting changes in sensation, numbness, and/or tingling.  Increased weakness or increased pain.  Swelling of the foot and/or lower leg with calf pain.      Neurosurgery has office hours Monday through Friday, 8:00 AM to 4:00 PM except for holidays. There is an answering service available during non-office hours, with 24 hours neurosurgery coverage.  Report to the Emergency Department if you need immediate medical assistance.      Please contact Dr. Saucedo's office for any questions or concerns.  Typically, your first follow-up appointment after a lumbar laminectomy/ laminotomy/ disectomy is 10-14 days from the date of your operation if sutures or staples need to be removed.  Otherwise, the follow-up appointment is in 4-6 weeks.        This note will be sent to the patient's referring provider No ref. provider found and primary care provider Sawyer Anna II, MD.           Luisa Saucedo MD  Neurosurgeon

## 2023-03-15 NOTE — PATIENT INSTRUCTIONS
Ms. Day is a 58 y.o. female who has a past medical history significant for hypertension, GERD, chronic low back pain, and osteoporosis.  She presents as a follow-up patient in the neurosurgery clinic for new onset of right leg pain and numbness for a duration of 4 weeks after standing up from a recliner.  The patient has a normal motor exam with decreased sensation in the right S1 dermatome along her right lateral lower leg and foot.    I reviewed pertinent imaging studies with the patient.  A MRI lumbar spine without gadolinium demonstrates normal alignment.  Large right L5-S1 disc herniation with neuroforaminal narrowing.  The disc extends behind S1.         PLAN:    Encounter Diagnoses   Name Primary?    Disc displacement, lumbar Yes    Lumbar spondylolysis     Preoperative testing     Gastroesophageal reflux disease, unspecified whether esophagitis present     Hypertension, unspecified type     Coagulation defect      Orders Placed This Encounter   Procedures    X-Ray Chest PA And Lateral    Protime-INR    APTT    CBC Auto Differential    Comprehensive Metabolic Panel    Urinalysis    EKG 12-lead        1.  I discussed with Ms. Day that her symptoms of right lower extremity radiculopathy have not significantly improved with optimization of medical management.  Her right leg pain and numbness has been disruptive to her daily activities.  Therefore, I recommend surgery, specifically a right L5-S1 diskectomy.  She understands that this surgery is anticipated to improve her right leg pain, but is not expected to change her chronic low back pain.   I reviewed the risks, benefits, and alternatives of this surgery.  The patient agrees to these risks and would like to proceed.  All questions were answered to her satisfaction.      2.  A preoperative assessment will be completed today.  She has signed a surgical consent form.    3.  The patient is being scheduled for a right L5-S1 diskectomy on Wednesday,  03/29/2023 at 7:30 AM.    4.  We discussed the goal to become nicotine free, as nicotine inhibits tissue healing.          The risks, benefits, and alternatives to surgery were discussed with the patient.     Complications of a Lumbar Discectomy may include:  Failure to improve symptoms and/or increased or persistent pain; Recurrence, continuation, or worsening of the condition that required the operation; Need for further surgical intervention or treatment; Neurological injury, which may include spinal cord or nerve root injury, paralysis (which involves the inability to move arms and/or legs), clumsiness, loss of sympathetic response, loss of sensation, and loss of bowel, bladder, and sexual function; Residual or recurrent disc herniation; Delayed or immediate spinal instability; Cerebral spinal fluid leak; Meningitis; Damage to major blood vessels, nerves, and surrounding anatomical structures; Scarring; Blindness; and Positioning problems such as neuropathy or compartment syndrome.    Complications of any surgery may include:  Adverse reaction to anesthesia; Bleeding; Transfusion of blood products, which carries a risk of infection or reaction; Infection, which requires treatment with antibiotics by mouth or intravenously, or even further surgeries; Urinary tract infection; Heart attack, stroke, pneumonia, and DVT/PE (blood clot in the legs or pelvis that can dislodge and go to the lungs); Other unforeseen complications; Coma; and Death.    Benefits of surgery include:  Possible improvement in buttocks and leg pain, numbness, and/or weakness; and possible improvement in back pain.    Alternatives to the procedure include:  Physical therapy, chiropractic care, acupuncture, medical therapy, and pain management.       LUMBAR LAMINECTOMY/ LAMINOTOMY/ DISCECTOMY  DISCHARGE INSTRUCTIONS      1.   Keep your incision clean and dry.    If your sutures are under the skin, they will dissolve with time.  If your sutures or  staples are over the skin, they will be removed at your first postoperative follow-up appointment in 10-14 days.   Wait at least 72 hours from the time of your surgery to take a shower.  After showering, pat your incision dry.  Do not immerse your incision in water for 4-6 weeks (e.g. bath tub, hot tub, swimming pool).      2.  Activity restrictions:  No lifting greater than 15 pounds for 4-6 weeks.  No bending, stooping, or twisting.  Avoid sitting in one position for over 30 minutes at a time.  No impact exercise or contact sports for at least 3 months.  To resume driving short distances (<30 minutes), you must be off of your narcotic medications and be able to comfortably brake suddenly, should the need arise.    Get up and walk.      3.  Contact your Neurosurgeon if the following occurs:  Signs and symptoms of infection, including fever above 101.5 degrees Fahrenheit and/or chills.  Redness, swelling, warmth, or drainage from the incision.  Any lasting changes in sensation, numbness, and/or tingling.  Increased weakness or increased pain.  Swelling of the foot and/or lower leg with calf pain.      Neurosurgery has office hours Monday through Friday, 8:00 AM to 4:00 PM except for holidays. There is an answering service available during non-office hours, with 24 hours neurosurgery coverage.  Report to the Emergency Department if you need immediate medical assistance.      Please contact Dr. Saucedo's office for any questions or concerns.  Typically, your first follow-up appointment after a lumbar laminectomy/ laminotomy/ disectomy is 10-14 days from the date of your operation if sutures or staples need to be removed.  Otherwise, the follow-up appointment is in 4-6 weeks.        This note will be sent to the patient's referring provider No ref. provider found and primary care provider Sawyer Anna II, MD.

## 2023-03-20 RX ORDER — OMEPRAZOLE 10 MG/1
10 CAPSULE, DELAYED RELEASE ORAL DAILY
COMMUNITY

## 2023-03-22 ENCOUNTER — HOSPITAL ENCOUNTER (OUTPATIENT)
Dept: RADIOLOGY | Facility: HOSPITAL | Age: 58
Discharge: HOME | End: 2023-03-22
Attending: NEUROLOGICAL SURGERY
Payer: COMMERCIAL

## 2023-03-22 ENCOUNTER — TELEPHONE (OUTPATIENT)
Dept: NEUROSURGERY | Facility: CLINIC | Age: 58
End: 2023-03-22
Payer: COMMERCIAL

## 2023-03-22 DIAGNOSIS — M43.06 LUMBAR SPONDYLOLYSIS: ICD-10-CM

## 2023-03-22 DIAGNOSIS — M51.26 DISC DISPLACEMENT, LUMBAR: ICD-10-CM

## 2023-03-22 DIAGNOSIS — K21.9 GASTROESOPHAGEAL REFLUX DISEASE, UNSPECIFIED WHETHER ESOPHAGITIS PRESENT: ICD-10-CM

## 2023-03-22 DIAGNOSIS — I10 HYPERTENSION, UNSPECIFIED TYPE: ICD-10-CM

## 2023-03-22 DIAGNOSIS — Z01.818 PREOPERATIVE TESTING: ICD-10-CM

## 2023-03-22 PROCEDURE — 71046 X-RAY EXAM CHEST 2 VIEWS: CPT | Mod: TC

## 2023-03-22 NOTE — TELEPHONE ENCOUNTER
I reviewed Ms. Day's lab and chest x-ray results that were completed today on 03/22/2023 as part of her preoperative workup.  All results are within acceptable ranges to proceed with surgery.      Ms. Day is scheduled for a right L5-S1 diskectomy on Wednesday, 03/29/2023 at 7:30 a.m.

## 2023-03-24 ENCOUNTER — ANESTHESIA EVENT (OUTPATIENT)
Dept: SURGERY | Facility: HOSPITAL | Age: 58
DRG: 520 | End: 2023-03-24
Payer: COMMERCIAL

## 2023-03-24 ENCOUNTER — TELEPHONE (OUTPATIENT)
Dept: NEUROSURGERY | Facility: CLINIC | Age: 58
End: 2023-03-24
Payer: COMMERCIAL

## 2023-03-24 NOTE — TELEPHONE ENCOUNTER
In regards to patient's abnormal EKG, I discussed the situation with [place] with the PAT clinic. They have obtained her cardiology records (h/o CAD-had work up last year), and LEIGH Scott w/ NAS, will discuss the findings with the anesthesiologist and she will let me know if clearance needs to be obtained or if they are ok with proceeding with surgery.

## 2023-03-28 ENCOUNTER — TELEPHONE (OUTPATIENT)
Dept: NEUROSURGERY | Facility: CLINIC | Age: 58
End: 2023-03-28
Payer: COMMERCIAL

## 2023-03-28 DIAGNOSIS — M51.26 HERNIATED LUMBAR INTERVERTEBRAL DISC: ICD-10-CM

## 2023-03-28 DIAGNOSIS — M51.26 DISC DISPLACEMENT, LUMBAR: Primary | ICD-10-CM

## 2023-03-28 RX ORDER — MUPIROCIN 20 MG/G
1 OINTMENT TOPICAL 2 TIMES DAILY
Status: CANCELLED | OUTPATIENT
Start: 2023-03-28 | End: 2023-03-29

## 2023-03-29 ENCOUNTER — ANESTHESIA (OUTPATIENT)
Dept: SURGERY | Facility: HOSPITAL | Age: 58
DRG: 520 | End: 2023-03-29
Payer: COMMERCIAL

## 2023-03-29 ENCOUNTER — HOSPITAL ENCOUNTER (INPATIENT)
Facility: HOSPITAL | Age: 58
LOS: 1 days | Discharge: HOME | DRG: 520 | End: 2023-03-30
Attending: NEUROLOGICAL SURGERY | Admitting: NEUROLOGICAL SURGERY
Payer: COMMERCIAL

## 2023-03-29 DIAGNOSIS — M51.26 DISC DISPLACEMENT, LUMBAR: ICD-10-CM

## 2023-03-29 DIAGNOSIS — M51.26 HERNIATED LUMBAR INTERVERTEBRAL DISC: ICD-10-CM

## 2023-03-29 LAB
GROUP & RH: NORMAL
INDIRECT COOMBS GEL: NORMAL
SPECIMEN OUTDATE: NORMAL

## 2023-03-29 PROCEDURE — 63030 PR LAMINOTOMY,LUMBAR DISK,1 INTRSP: ICD-10-PCS | Mod: RT,,, | Performed by: NEUROLOGICAL SURGERY

## 2023-03-29 PROCEDURE — 63600175 PHARM REV CODE 636 W HCPCS: Performed by: STUDENT IN AN ORGANIZED HEALTH CARE EDUCATION/TRAINING PROGRAM

## 2023-03-29 PROCEDURE — 63600175 PHARM REV CODE 636 W HCPCS: Performed by: NEUROLOGICAL SURGERY

## 2023-03-29 PROCEDURE — 86900 BLOOD TYPING SEROLOGIC ABO: CPT | Performed by: NEUROLOGICAL SURGERY

## 2023-03-29 PROCEDURE — 25000003 PHARM REV CODE 250: Performed by: STUDENT IN AN ORGANIZED HEALTH CARE EDUCATION/TRAINING PROGRAM

## 2023-03-29 PROCEDURE — 71000015 HC POSTOP RECOV 1ST HR: Performed by: NEUROLOGICAL SURGERY

## 2023-03-29 PROCEDURE — 37000009 HC ANESTHESIA EA ADD 15 MINS: Performed by: NEUROLOGICAL SURGERY

## 2023-03-29 PROCEDURE — 71000033 HC RECOVERY, INTIAL HOUR: Performed by: NEUROLOGICAL SURGERY

## 2023-03-29 PROCEDURE — 27201423 OPTIME MED/SURG SUP & DEVICES STERILE SUPPLY: Performed by: NEUROLOGICAL SURGERY

## 2023-03-29 PROCEDURE — 25000003 PHARM REV CODE 250: Performed by: NEUROLOGICAL SURGERY

## 2023-03-29 PROCEDURE — 36000711: Performed by: NEUROLOGICAL SURGERY

## 2023-03-29 PROCEDURE — 11000001 HC ACUTE MED/SURG PRIVATE ROOM

## 2023-03-29 PROCEDURE — 99024 POSTOP FOLLOW-UP VISIT: CPT | Mod: ,,, | Performed by: NEUROLOGICAL SURGERY

## 2023-03-29 PROCEDURE — 99900035 HC TECH TIME PER 15 MIN (STAT)

## 2023-03-29 PROCEDURE — 99024 PR POST-OP FOLLOW-UP VISIT: ICD-10-PCS | Mod: ,,, | Performed by: NEUROLOGICAL SURGERY

## 2023-03-29 PROCEDURE — 63030 LAMOT DCMPRN NRV RT 1 LMBR: CPT | Mod: RT,,, | Performed by: NEUROLOGICAL SURGERY

## 2023-03-29 PROCEDURE — 97166 OT EVAL MOD COMPLEX 45 MIN: CPT

## 2023-03-29 PROCEDURE — 71000039 HC RECOVERY, EACH ADD'L HOUR: Performed by: NEUROLOGICAL SURGERY

## 2023-03-29 PROCEDURE — 37000008 HC ANESTHESIA 1ST 15 MINUTES: Performed by: NEUROLOGICAL SURGERY

## 2023-03-29 PROCEDURE — 36000710: Performed by: NEUROLOGICAL SURGERY

## 2023-03-29 RX ORDER — GLYCOPYRROLATE 0.2 MG/ML
INJECTION INTRAMUSCULAR; INTRAVENOUS
Status: DISCONTINUED | OUTPATIENT
Start: 2023-03-29 | End: 2023-03-29

## 2023-03-29 RX ORDER — LIDOCAINE HYDROCHLORIDE 20 MG/ML
INJECTION INTRAVENOUS
Status: DISCONTINUED | OUTPATIENT
Start: 2023-03-29 | End: 2023-03-29

## 2023-03-29 RX ORDER — SODIUM CHLORIDE 9 MG/ML
INJECTION, SOLUTION INTRAVENOUS CONTINUOUS
Status: DISCONTINUED | OUTPATIENT
Start: 2023-03-29 | End: 2023-03-30

## 2023-03-29 RX ORDER — DEXAMETHASONE SODIUM PHOSPHATE 4 MG/ML
INJECTION, SOLUTION INTRA-ARTICULAR; INTRALESIONAL; INTRAMUSCULAR; INTRAVENOUS; SOFT TISSUE
Status: DISCONTINUED | OUTPATIENT
Start: 2023-03-29 | End: 2023-03-29

## 2023-03-29 RX ORDER — EZETIMIBE 10 MG/1
10 TABLET ORAL DAILY
Status: DISCONTINUED | OUTPATIENT
Start: 2023-03-29 | End: 2023-03-30 | Stop reason: HOSPADM

## 2023-03-29 RX ORDER — ONDANSETRON 2 MG/ML
INJECTION INTRAMUSCULAR; INTRAVENOUS
Status: DISCONTINUED | OUTPATIENT
Start: 2023-03-29 | End: 2023-03-29

## 2023-03-29 RX ORDER — ROCURONIUM BROMIDE 10 MG/ML
INJECTION, SOLUTION INTRAVENOUS
Status: DISCONTINUED | OUTPATIENT
Start: 2023-03-29 | End: 2023-03-29

## 2023-03-29 RX ORDER — HYDROMORPHONE HYDROCHLORIDE 2 MG/ML
INJECTION, SOLUTION INTRAMUSCULAR; INTRAVENOUS; SUBCUTANEOUS
Status: DISCONTINUED | OUTPATIENT
Start: 2023-03-29 | End: 2023-03-29

## 2023-03-29 RX ORDER — HYDROMORPHONE HYDROCHLORIDE 2 MG/ML
0.4 INJECTION, SOLUTION INTRAMUSCULAR; INTRAVENOUS; SUBCUTANEOUS EVERY 5 MIN PRN
Status: DISCONTINUED | OUTPATIENT
Start: 2023-03-29 | End: 2023-03-29 | Stop reason: HOSPADM

## 2023-03-29 RX ORDER — PROMETHAZINE HYDROCHLORIDE 25 MG/1
25 TABLET ORAL
Status: DISCONTINUED | OUTPATIENT
Start: 2023-03-29 | End: 2023-03-30 | Stop reason: HOSPADM

## 2023-03-29 RX ORDER — FENTANYL CITRATE 50 UG/ML
INJECTION, SOLUTION INTRAMUSCULAR; INTRAVENOUS
Status: DISCONTINUED | OUTPATIENT
Start: 2023-03-29 | End: 2023-03-29

## 2023-03-29 RX ORDER — CEFAZOLIN SODIUM 2 G/50ML
2 SOLUTION INTRAVENOUS
Status: DISPENSED | OUTPATIENT
Start: 2023-03-29 | End: 2023-03-30

## 2023-03-29 RX ORDER — AMLODIPINE BESYLATE 5 MG/1
10 TABLET ORAL DAILY
Status: DISCONTINUED | OUTPATIENT
Start: 2023-03-30 | End: 2023-03-30 | Stop reason: HOSPADM

## 2023-03-29 RX ORDER — LIDOCAINE HYDROCHLORIDE AND EPINEPHRINE 10; 10 MG/ML; UG/ML
INJECTION, SOLUTION INFILTRATION; PERINEURAL
Status: DISCONTINUED | OUTPATIENT
Start: 2023-03-29 | End: 2023-03-29 | Stop reason: HOSPADM

## 2023-03-29 RX ORDER — CHOLECALCIFEROL (VITAMIN D3) 25 MCG
1000 TABLET ORAL DAILY
Status: DISCONTINUED | OUTPATIENT
Start: 2023-03-29 | End: 2023-03-30 | Stop reason: HOSPADM

## 2023-03-29 RX ORDER — AMITRIPTYLINE HYDROCHLORIDE 25 MG/1
25 TABLET, FILM COATED ORAL NIGHTLY
Status: DISCONTINUED | OUTPATIENT
Start: 2023-03-29 | End: 2023-03-30 | Stop reason: HOSPADM

## 2023-03-29 RX ORDER — AMOXICILLIN 250 MG
2 CAPSULE ORAL NIGHTLY PRN
Status: DISCONTINUED | OUTPATIENT
Start: 2023-03-29 | End: 2023-03-30 | Stop reason: HOSPADM

## 2023-03-29 RX ORDER — GABAPENTIN 300 MG/1
600 CAPSULE ORAL 3 TIMES DAILY
Status: DISCONTINUED | OUTPATIENT
Start: 2023-03-29 | End: 2023-03-30 | Stop reason: HOSPADM

## 2023-03-29 RX ORDER — HYDROCODONE BITARTRATE AND ACETAMINOPHEN 10; 325 MG/1; MG/1
1 TABLET ORAL
COMMUNITY

## 2023-03-29 RX ORDER — KETAMINE HYDROCHLORIDE 100 MG/ML
INJECTION, SOLUTION INTRAMUSCULAR; INTRAVENOUS
Status: DISCONTINUED | OUTPATIENT
Start: 2023-03-29 | End: 2023-03-29

## 2023-03-29 RX ORDER — SODIUM CHLORIDE 0.9 % (FLUSH) 0.9 %
10 SYRINGE (ML) INJECTION
Status: DISCONTINUED | OUTPATIENT
Start: 2023-03-29 | End: 2023-03-29 | Stop reason: HOSPADM

## 2023-03-29 RX ORDER — PROPOFOL 10 MG/ML
VIAL (ML) INTRAVENOUS
Status: DISCONTINUED | OUTPATIENT
Start: 2023-03-29 | End: 2023-03-29

## 2023-03-29 RX ORDER — MUPIROCIN 20 MG/G
1 OINTMENT TOPICAL 2 TIMES DAILY
Status: DISCONTINUED | OUTPATIENT
Start: 2023-03-29 | End: 2023-03-29

## 2023-03-29 RX ORDER — SCOLOPAMINE TRANSDERMAL SYSTEM 1 MG/1
1 PATCH, EXTENDED RELEASE TRANSDERMAL
Status: DISCONTINUED | OUTPATIENT
Start: 2023-03-29 | End: 2023-03-29

## 2023-03-29 RX ORDER — MIDAZOLAM HYDROCHLORIDE 1 MG/ML
INJECTION INTRAMUSCULAR; INTRAVENOUS
Status: DISCONTINUED | OUTPATIENT
Start: 2023-03-29 | End: 2023-03-29

## 2023-03-29 RX ORDER — PANTOPRAZOLE SODIUM 40 MG/1
40 TABLET, DELAYED RELEASE ORAL DAILY
Status: DISCONTINUED | OUTPATIENT
Start: 2023-03-29 | End: 2023-03-30 | Stop reason: HOSPADM

## 2023-03-29 RX ORDER — BUPIVACAINE HYDROCHLORIDE AND EPINEPHRINE 5; 5 MG/ML; UG/ML
INJECTION, SOLUTION EPIDURAL; INTRACAUDAL; PERINEURAL
Status: DISCONTINUED | OUTPATIENT
Start: 2023-03-29 | End: 2023-03-29 | Stop reason: HOSPADM

## 2023-03-29 RX ORDER — MORPHINE SULFATE 10 MG/ML
2 INJECTION INTRAMUSCULAR; INTRAVENOUS; SUBCUTANEOUS EVERY 4 HOURS PRN
Status: DISCONTINUED | OUTPATIENT
Start: 2023-03-29 | End: 2023-03-30 | Stop reason: HOSPADM

## 2023-03-29 RX ORDER — ALBUTEROL SULFATE 0.83 MG/ML
2.5 SOLUTION RESPIRATORY (INHALATION) 2 TIMES DAILY
Status: DISCONTINUED | OUTPATIENT
Start: 2023-03-29 | End: 2023-03-30 | Stop reason: HOSPADM

## 2023-03-29 RX ORDER — CEFAZOLIN SODIUM 1 G/3ML
INJECTION, POWDER, FOR SOLUTION INTRAMUSCULAR; INTRAVENOUS
Status: DISCONTINUED | OUTPATIENT
Start: 2023-03-29 | End: 2023-03-29 | Stop reason: HOSPADM

## 2023-03-29 RX ORDER — HYDROCODONE BITARTRATE AND ACETAMINOPHEN 10; 325 MG/1; MG/1
1 TABLET ORAL EVERY 6 HOURS PRN
Status: DISCONTINUED | OUTPATIENT
Start: 2023-03-29 | End: 2023-03-30 | Stop reason: HOSPADM

## 2023-03-29 RX ORDER — TIZANIDINE 4 MG/1
8 TABLET ORAL NIGHTLY
Status: DISCONTINUED | OUTPATIENT
Start: 2023-03-29 | End: 2023-03-30 | Stop reason: HOSPADM

## 2023-03-29 RX ORDER — CEFAZOLIN SODIUM 2 G/50ML
2 SOLUTION INTRAVENOUS
Status: COMPLETED | OUTPATIENT
Start: 2023-03-29 | End: 2023-03-29

## 2023-03-29 RX ADMIN — HYDROMORPHONE HYDROCHLORIDE 0.6 MG: 2 INJECTION INTRAMUSCULAR; INTRAVENOUS; SUBCUTANEOUS at 09:03

## 2023-03-29 RX ADMIN — CEFAZOLIN SODIUM 2 G: 2 SOLUTION INTRAVENOUS at 05:03

## 2023-03-29 RX ADMIN — GABAPENTIN 600 MG: 300 CAPSULE ORAL at 02:03

## 2023-03-29 RX ADMIN — SODIUM CHLORIDE, SODIUM GLUCONATE, SODIUM ACETATE, POTASSIUM CHLORIDE AND MAGNESIUM CHLORIDE: 526; 502; 368; 37; 30 INJECTION, SOLUTION INTRAVENOUS at 07:03

## 2023-03-29 RX ADMIN — KETAMINE HYDROCHLORIDE 10 MG: 100 INJECTION, SOLUTION, CONCENTRATE INTRAMUSCULAR; INTRAVENOUS at 08:03

## 2023-03-29 RX ADMIN — EZETIMIBE 10 MG: 10 TABLET ORAL at 05:03

## 2023-03-29 RX ADMIN — PHENYLEPHRINE HYDROCHLORIDE 10 MCG/MIN: 10 INJECTION INTRAVENOUS at 08:03

## 2023-03-29 RX ADMIN — ROCURONIUM BROMIDE 20 MG: 10 SOLUTION INTRAVENOUS at 08:03

## 2023-03-29 RX ADMIN — ONDANSETRON 4 MG: 2 INJECTION INTRAMUSCULAR; INTRAVENOUS at 07:03

## 2023-03-29 RX ADMIN — PROPOFOL 150 MG: 10 INJECTION, EMULSION INTRAVENOUS at 07:03

## 2023-03-29 RX ADMIN — DEXAMETHASONE SODIUM PHOSPHATE 4 MG: 4 INJECTION, SOLUTION INTRA-ARTICULAR; INTRALESIONAL; INTRAMUSCULAR; INTRAVENOUS; SOFT TISSUE at 07:03

## 2023-03-29 RX ADMIN — TIZANIDINE 8 MG: 4 TABLET ORAL at 09:03

## 2023-03-29 RX ADMIN — MIDAZOLAM HYDROCHLORIDE 2 MG: 1 INJECTION, SOLUTION INTRAMUSCULAR; INTRAVENOUS at 07:03

## 2023-03-29 RX ADMIN — HYDROCODONE BITARTRATE AND ACETAMINOPHEN 1 TABLET: 10; 325 TABLET ORAL at 02:03

## 2023-03-29 RX ADMIN — CEFAZOLIN SODIUM 2 G: 2 SOLUTION INTRAVENOUS at 07:03

## 2023-03-29 RX ADMIN — LIDOCAINE HYDROCHLORIDE 80 MG: 20 INJECTION, SOLUTION INTRAVENOUS at 07:03

## 2023-03-29 RX ADMIN — SUGAMMADEX 200 MG: 100 INJECTION, SOLUTION INTRAVENOUS at 09:03

## 2023-03-29 RX ADMIN — KETAMINE HYDROCHLORIDE 20 MG: 100 INJECTION, SOLUTION, CONCENTRATE INTRAMUSCULAR; INTRAVENOUS at 08:03

## 2023-03-29 RX ADMIN — AMITRIPTYLINE HYDROCHLORIDE 25 MG: 25 TABLET, FILM COATED ORAL at 09:03

## 2023-03-29 RX ADMIN — GLYCOPYRROLATE 0.2 MG: 0.2 INJECTION INTRAMUSCULAR; INTRAVENOUS at 07:03

## 2023-03-29 RX ADMIN — FENTANYL CITRATE 100 MCG: 50 INJECTION, SOLUTION INTRAMUSCULAR; INTRAVENOUS at 07:03

## 2023-03-29 RX ADMIN — DEXAMETHASONE SODIUM PHOSPHATE 8 MG: 4 INJECTION, SOLUTION INTRA-ARTICULAR; INTRALESIONAL; INTRAMUSCULAR; INTRAVENOUS; SOFT TISSUE at 07:03

## 2023-03-29 RX ADMIN — ROCURONIUM BROMIDE 60 MG: 10 SOLUTION INTRAVENOUS at 07:03

## 2023-03-29 RX ADMIN — GABAPENTIN 600 MG: 300 CAPSULE ORAL at 09:03

## 2023-03-29 RX ADMIN — DEXAMETHASONE SODIUM PHOSPHATE 4 MG: 4 INJECTION, SOLUTION INTRA-ARTICULAR; INTRALESIONAL; INTRAMUSCULAR; INTRAVENOUS; SOFT TISSUE at 09:03

## 2023-03-29 RX ADMIN — ONDANSETRON 4 MG: 2 INJECTION INTRAMUSCULAR; INTRAVENOUS at 09:03

## 2023-03-29 NOTE — ANESTHESIA PROCEDURE NOTES
Intubation    Date/Time: 3/29/2023 7:32 AM  Performed by: Jasmeet Major CRNA  Authorized by: Robin Robins MD     Intubation:     Induction:  Intravenous    Intubated:  Postinduction    Mask Ventilation:  Easy mask    Attempts:  1    Attempted By:  CRNA    Method of Intubation:  Direct    Blade:  Jara 2    Laryngeal View Grade: Grade I - full view of cords      Difficult Airway Encountered?: No      Complications:  None    Airway Device:  Oral endotracheal tube    Airway Device Size:  7.0    Style/Cuff Inflation:  Cuffed (inflated to minimal occlusive pressure)    Tube secured:  22    Secured at:  The lips    Placement Verified By:  Capnometry    Complicating Factors:  None    Findings Post-Intubation:  BS equal bilateral and atraumatic/condition of teeth unchanged

## 2023-03-29 NOTE — TRANSFER OF CARE
"Anesthesia Transfer of Care Note    Patient: Roseann Day    Procedure(s) Performed: Procedure(s) (LRB):  DISCECTOMY, SPINE, LUMBAR (Right)    Patient location: PACU    Anesthesia Type: general    Transport from OR: Transported from OR on room air with adequate spontaneous ventilation    Post pain: adequate analgesia    Post assessment: no apparent anesthetic complications    Post vital signs: stable    Level of consciousness: awake    Nausea/Vomiting: no nausea/vomiting    Complications: none    Transfer of care protocol was followed      Last vitals:   Visit Vitals  /65 (BP Location: Right arm, Patient Position: Lying)   Pulse 86   Temp 36.4 °C (97.6 °F) (Oral)   Resp 16   Ht 5' 2" (1.575 m)   Wt 59.9 kg (132 lb 0.9 oz)   SpO2 98%   BMI 24.15 kg/m²     "

## 2023-03-29 NOTE — ANESTHESIA PREPROCEDURE EVALUATION
03/29/2023  Roseann Day is a 58 y.o., female.    Other Medical History   Sciatica of right side Hyperlipidemia   Hypertension GERD (gastroesophageal reflux disease)   Osteoporosis Coronary artery disease   Displacement of lumbar intervertebral disc      Surgical History  GALLBLADDER SURGERY apendix   COLONOSCOPY HYSTERECTOMY   APPENDECTOMY CHOLECYSTECTOMY   SINUS SURGERY TONSILLECTOMY   CARPAL TUNNEL RELEASE ULNAR TUNNEL RELEASE   FUNDOPLICATION, NISSEN, PEDIATRIC HERNIA REPAIR     ecg nsr, septal infarct  Denies MI, CHF, angina  Hx of htn, hld, gerd (well controlled)  Nl stress test last summer    Pre-op Assessment    I have reviewed the Patient Summary Reports.     I have reviewed the Nursing Notes. I have reviewed the NPO Status.   I have reviewed the Medications.     Review of Systems  Hematology/Oncology:  Hematology Normal   Oncology Normal     EENT/Dental:EENT/Dental Normal   Cardiovascular:   Hypertension CAD      Pulmonary:  Pulmonary Normal    Hepatic/GI:   GERD    Musculoskeletal:   Arthritis     Neurological:   Neuromuscular Disease, (sciatica)    Endocrine:  Endocrine Normal        Physical Exam  General: Well nourished, Cooperative and Alert    Airway:  Mallampati: II   Mouth Opening: Normal  TM Distance: Normal      Dental:  Intact        Anesthesia Plan  Type of Anesthesia, risks & benefits discussed:    Anesthesia Type: Gen ETT  Intra-op Monitoring Plan: Standard ASA Monitors  Post Op Pain Control Plan: multimodal analgesia  Induction:  IV  Informed Consent: Informed consent signed with the Patient and all parties understand the risks and agree with anesthesia plan.  All questions answered.   ASA Score: 2  Day of Surgery Review of History & Physical: H&P Update referred to the surgeon/provider.    Ready For Surgery From Anesthesia Perspective.     .

## 2023-03-29 NOTE — PT/OT/SLP EVAL
Occupational Therapy  Evaluation    Name: Roseann Day  MRN: [id number]  Admitting Diagnosis: Herniated lumbar intervertebral disc  Recent Surgery: Procedure(s) (LRB):  DISCECTOMY, SPINE, LUMBAR (Right) Day of Surgery    Recommendations:     Discharge Recommendations: home with home health  Discharge Equipment Recommendations:  shower chair  Barriers to discharge:       Assessment:     Roseann Day is a 58 y.o. female with a medical diagnosis of Herniated lumbar intervertebral disc s/p laminectomy/laminotomy/disectomy. Performance deficits affecting function: weakness, new precautions, slight gait instability, balance deficits, self care deficits. She presents with motivation to participate on eval. Pt overall required SBA/CGA to perform self care tasks and transfers. Pt and  agreeing that Pt can return home with home health; has family/friend support.    Rehab Prognosis: Good; patient would benefit from acute skilled OT services to address these deficits and reach maximum level of function.       Plan:     Patient to be seen 3 x/week to address the above listed problems via self-care/home management, therapeutic activities, therapeutic exercises  Plan of Care Expires: 04/12/23  Plan of Care Reviewed with: patient, spouse    Subjective     Chief Complaint: none  Patient/Family Comments/goals: to go home    Occupational Profile:  Living Environment: lives in [facility] with , small ledge to enter. Uses walk in shower  Previous level of function: independent in all ADLs/IADLs; does not drive 2/2 eye problems at baseline  Roles and Routines: wife, friend, mother  Equipment Used at Home: none  Assistance upon Discharge: , friends    Pain/Comfort:  Pain Rating 1: 0/10    Patients cultural, spiritual, Alevism conflicts given the current situation: no    Objective:     Communicated with: NSG prior to session.  Patient found supine with  (PIV, watters catheter) upon OT entry to  room.    General Precautions: Standard  Orthopedic Precautions:  (Spinal)  Braces:    Respiratory Status: Room air  Vital Signs: WFL throughout    Occupational Performance:    Bed Mobility:    Patient completed Supine to Sit with stand by assistance  Patient completed Sit to Supine with stand by assistance  Completed log rolling to right side with Min A for proper mechanics    Functional Mobility/Transfers:  Patient completed Sit <> Stand Transfer with CGA from bed; Min A ascend/descend from toilet  with  rolling walker   Patient completed Toilet Transfer Step Transfer technique with contact guard assistance with  rolling walker  Functional Mobility: ambulated to/from toilet with RW, CGA    Activities of Daily Living:  Grooming: set up assist to wash face seated EOB  Lower Body Dressing: total assistance to don socks  Toileting: minimum assistance to ascend/descend from toilet; demo's ability to wipe following BM    Cognitive/Visual Perceptual:  Visual/Perceptual:      -Intact  tracking of left eye; right eye covered with eye patch    Physical Exam:  Sensation:    -       Intact  light/touch bilateral upper extremities  Upper Extremity Strength:    -       Right Upper Extremity: WFL  -       Left Upper Extremity: WFL  Fine Motor Coordination:    -       Intact  Left hand, finger to nose and Right hand, finger to nose    Therapeutic Positioning  Skin integrity:  surgical site with bandages; all other skin visible intact    Risk for acquired pressure injuries is decreased due to ability to mobilize independently .    The following interventions were performed in an effort to prevent and/or reduce acquired pressure ulcers: all visible skin was assessed during the course of treatment    OT recommendations for therapeutic positioning throughout hospitalization: limit of 3 layers under patient at all times     Additional Treatment:  Discussed with evaluating PT trialing ambulation without AD as Pt does not use any at  baseline     Patient Education:  Patient and spouse provided with verbal and demonstration education regarding spinal precautions. Education provided on d/c recs and POC, as well as having a shower chair in case of fatigue/pain with showering. Understanding was verbalized.     Patient left HOB elevated with all lines intact, call button in reach, NSG notified, and spouse present    GOALS:   Multidisciplinary Problems       Occupational Therapy Goals          Problem: Occupational Therapy    Goal Priority Disciplines Outcome Interventions   Occupational Therapy Goal     OT, PT/OT Ongoing, Progressing    Description: Goals to be met by: 4/12/23     Patient will increase functional independence with ADLs by performing:    LE Dressing with Modified Moody.  Grooming while standing at sink with Moody.  Toileting from toilet with Moody for hygiene and clothing management.   Toilet transfer to toilet with Moody.                         History:     Past Medical History:   Diagnosis Date    Coronary artery disease     Displacement of lumbar intervertebral disc     GERD (gastroesophageal reflux disease)     Hyperlipidemia     Hypertension     Osteoporosis     PONV (postoperative nausea and vomiting)     Sciatica of right side          Past Surgical History:   Procedure Laterality Date    apendix      APPENDECTOMY      CARPAL TUNNEL RELEASE Bilateral     CHOLECYSTECTOMY      COLONOSCOPY  04/04/2019    FUNDOPLICATION, NISSEN, PEDIATRIC      GALLBLADDER SURGERY      HERNIA REPAIR      HYSTERECTOMY      SINUS SURGERY      TONSILLECTOMY      ULNAR TUNNEL RELEASE Bilateral        Time Tracking:     OT Date of Treatment:    OT Start Time: 1321  OT Stop Time: 1347  OT Total Time (min): 26 min    Billable Minutes:Evaluation mod    3/29/2023

## 2023-03-29 NOTE — PLAN OF CARE
Problem: Occupational Therapy  Goal: Occupational Therapy Goal  Description: Goals to be met by: 4/12/23     Patient will increase functional independence with ADLs by performing:    LE Dressing with Modified Adams.  Grooming while standing at sink with [place].  Toileting from toilet with [place] for hygiene and clothing management.   Toilet transfer to toilet with Adams.    Outcome: Ongoing, Progressing

## 2023-03-29 NOTE — PROGRESS NOTES
Hospital Progress Note  [facility] Neurosurgery      Admit Date: 3/29/2023  Post-operative Day: Day of Surgery  Hospital Day: 1      SUBJECTIVE:   POD #0 s/p right L5-S1 diskectomy    Interval History:  Mr. Day is accompanied by her  in the room.  She has mild low back pain with postoperative improvement in her right leg pain and numbness.  The patient has minimal numbness her right toe.  Ms. Day has ambulated without assistance and feels ready to be discharged to home tomorrow.    Scheduled Meds:   albuterol  2.5 mg Nebulization BID    amitriptyline  25 mg Oral QHS    [START ON 3/30/2023] amLODIPine  10 mg Oral Daily    ceFAZolin (ANCEF) IVPB  2 g Intravenous Q8H    ezetimibe  10 mg Oral Daily    gabapentin  600 mg Oral TID    pantoprazole  40 mg Oral Daily    tiZANidine  8 mg Oral QHS    vitamin D  1,000 Units Oral Daily     Continuous Infusions:   sodium chloride 0.9%       PRN Meds:HYDROcodone-acetaminophen, morphine, promethazine, senna-docusate 8.6-50 mg    Review of patient's allergies indicates:  No Known Allergies    Past Medical History:   Diagnosis Date    Coronary artery disease     Displacement of lumbar intervertebral disc     GERD (gastroesophageal reflux disease)     Hyperlipidemia     Hypertension     Osteoporosis     PONV (postoperative nausea and vomiting)     Sciatica of right side      Past Surgical History:   Procedure Laterality Date    apendix      APPENDECTOMY      CARPAL TUNNEL RELEASE Bilateral     CHOLECYSTECTOMY      COLONOSCOPY  04/04/2019    FUNDOPLICATION, NISSEN, PEDIATRIC      GALLBLADDER SURGERY      HERNIA REPAIR      HYSTERECTOMY      SINUS SURGERY      TONSILLECTOMY      ULNAR TUNNEL RELEASE Bilateral        OBJECTIVE:     Vital Signs (Most Recent)  Temp: 97.8 °F (36.6 °C) (03/29/23 1437)  Pulse: 80 (03/29/23 1437)  Resp: 18 (03/29/23 1449)  BP: 122/72 (03/29/23 1437)  SpO2: 97 % (03/29/23 1437)    Vital Signs Range (Last 24H):  Temp:  [97 °F (36.1  °C)-97.8 °F (36.6 °C)]   Pulse:  [77-87]   Resp:  [10-18]   BP: (106-140)/(61-74)   SpO2:  [94 %-100 %]       Physical Exam:  General Awake, conversant     Motor Strength 5/5 in b LE         Sensory Nl to light touch in b LE except for decreased sensation to light touch in R toes   Wound Dressing- clean, dry, and intact       Laboratory Review:    CBC without Differential:  Lab Results   Component Value Date    WBC 9.4 03/22/2023    HGB 10.7 (L) 03/22/2023    HCT 37.3 03/22/2023     (H) 03/22/2023    MCV 81.6 03/22/2023     Differential:   No results found for: NEUTROABS, BASOSABS, MONOSABS    Basic Metabolic Panel:  Lab Results   Component Value Date     03/22/2023    K 4.2 03/22/2023    BUN 8.0 (L) 03/22/2023    MG 1.9 02/28/2019     Coagulation Studies:  Lab Results   Component Value Date    INR 1.01 03/22/2023    INR 1.0 12/02/2021    INR 0.89 04/06/2018    PROTIME 13.2 03/22/2023    PROTIME 12.5 12/02/2021    PROTIME 12.3 04/06/2018     Lab Results   Component Value Date    PTT 29.7 03/22/2023     Urinalysis:  Lab Results   Component Value Date    LEUKOCYTESUR Negative 03/22/2023    UROBILINOGEN 1.0 03/22/2023        ASSESSMENT/PLAN:     Ms. Day is a 58 y.o. female who has a past medical history significant for hypertension, GERD, chronic low back pain, and osteoporosis. She is POD #0 s/p a right L5-S1 discectomy.  Postoperatively, the patient has improvement in her right leg pain and numbness.  She has full strength in her bilateral lower extremities.      Plan:     1. The patient is encouraged to continue mobilizing with physical therapy and occupational therapy.      2. Ms. Day is anticipated to be able to be discharged to home tomorrow morning.  She had all her pain medication prescriptions already and does not need any of these filled before being discharged.    3. Arrangements have been made for the patient to follow up in my neurosurgery clinic 2 weeks postoperatively on Thursday,  04/13/2023 at 2:30 p.m.         Luisa Saucedo MD  Neurosurgery

## 2023-03-29 NOTE — OP NOTE
Neurosurgery Operative Note  [facility]      Patient Name: Roseann Day  MRN: [id number]  CSN:  [id number]  : 1965  Age:58 yrs  Sex:female  Admit Date: 3/29/2023    Surgery date: 3/29/2023  Surgeon(s) and Role:     * Luisa Saucedo MD - Primary  Assistant(s): None    Preop/ Preprocedure Diagnosis: Right L5-S1 herniated disc    Postop/ Postprocedure Diagnosis: Right L5-S1 herniated disc    Surgery: Right L5-S1 disectomy    Findings: Right L5-S1 disc bulge with removal intraoperatively.  The right S1 nerve root was decompressed.     Fluids: See anesthesia record  Estimated Blood Loss: less than 50 mL  Anesthesia Type: General  Blood Products: none  Specimens:  * No specimens in log *    Implants/Grafts: * No implants in log *  Drain(s), Tube(s), Packing: none    Complications: No immediate    Preoperative Indications:     Ms. Day is a 58 y.o. female who has a past medical history significant for hypertension, GERD, chronic low back pain, and osteoporosis.  She presented as a follow-up patient in the neurosurgery clinic for new onset of right leg pain and numbness for a duration of 6 weeks after standing up from a recliner.  The patient had a normal motor exam with decreased sensation in the right S1 dermatome along her right lateral lower leg and foot.     I reviewed pertinent imaging studies with the patient.  A MRI lumbar spine without gadolinium demonstrated normal alignment.  There was a large right L5-S1 disc herniation with neuroforaminal narrowing.  The disc extended behind S1.  I discussed with Ms. Day that her symptoms of right lower extremity radiculopathy had not significantly improved with optimization of medical management.  Her right leg pain and numbness has been disruptive to her daily activities.  Therefore, I recommended surgery, specifically a right L5-S1 diskectomy.  She understood that this surgery was anticipated to improve her right leg  pain, but was not expected to change her chronic low back pain.   I reviewed the risks, benefits, and alternatives of this surgery.  The patient agreed to these risks and would like to proceed.  All questions were answered to her satisfaction.        Operative Procedure:    The patient was brought to the operating room.  General endotracheal intubation was instituted by the anesthesia team.  The patient was then turned prone onto a Michael frame with her arms and legs appropriately padded.    It was anticipated that a midline incision be made on the patient's lower back.  A C-arm image was performed that correctly identified the L5-S1 level before proceeding.  The patient's back was prepped and draped in a normal sterile fashion.  A timeout was performed that correctly identified the patient, preoperative antibiotics, and procedure.    A solution of 1% lidocaine with epinephrine was infused into the anticipated incisional site.  The initial skin incision was made with an 10 scalpel blade.  Bovie cautery was used for hemostasis and for carrying out this incision to the spinous processes.  A Panteatlaner retractor was placed for visualization.  The Bovie cautery was used to dissect the soft tissues from the right lumbar spinous processes and lamina. Soft tissues were dissected from the underlying bone.  A Samayoa retractor was used for further visualization.    A curved curette was placed under the lamina.  A lateral C-arm image was performed, which correctly identified the right L5-S1 level.  At this time, the microscope was brought into the operative field.  A high-speed drill was used to thin the right L5 lamina.  Bone wax was used for hemostasis.  A curved curette was used to separate the underside of the bone from the ligamentum flavum.  A Kerrison punch was used to remove this bone in a piecemeal fashion to the level of the attachment of the ligamentum flavum. The ligamentum flavum was lifted up with a curved  curette and then a Kerrison punch was used to remove this ligament, thereby exposing the underlying dura and right S1 nerve root. A medial facetectomy and foraminotomy was completed.    Bipolar cautery was used for hemostasis.  The right S1 nerve root was decompressed intraoperatively.  A nerve root retractor was used to retract this nerve root medially.  A discectomy was performed on the right at L5-S1, which involved a 15 blade to incise the annulus.  With a technique involving a nerve hook, a down pushing curette, and Babcock pickups, disc contents were removed in a piecemeal fashion.  This disc space was flushed with saline.  At the conclusion of the case, there was good decompression of the right S1 nerve root, as was palpated by a nerve hook.    The area was copiously irrigated.  Gelfoam thrombin was used for hemostasis and then irrigated out.  The Samayoa retractor was removed and the microscope was removed.    Closure was then in order.  The fascia was infused with a solution of Marcaine with epinephrine.  The fascia was closed with interrupted 0 Vicryl sutures.  The subcutaneous layer was closed with interrupted, buried 2-0 Vicryl sutures.  The skin was everted and closed with a subcuticular 4.0 Monocryl.  Dermabond was placed on top with a 4 x 4 and Tegaderm.    The patient was brought back into the supine position.  She was brought to the PACU for further care.      Luisa Saucedo MD  Neurosurgery

## 2023-03-29 NOTE — ANESTHESIA POSTPROCEDURE EVALUATION
Anesthesia Post Evaluation    Patient: Roseann Day    Procedure(s) Performed: Procedure(s) (LRB):  DISCECTOMY, SPINE, LUMBAR (Right)    Final Anesthesia Type: general      Patient location during evaluation: PACU  Patient participation: Yes- Able to Participate  Level of consciousness: awake and alert  Post-procedure vital signs: reviewed and stable  Pain management: adequate  Airway patency: patent    PONV status at discharge: No PONV  Anesthetic complications: no      Respiratory status: unassisted  Hydration status: euvolemic  Follow-up not needed.          Vitals Value Taken Time   /72 03/29/23 1240   Temp nl 03/29/23 1246   Pulse 81 03/29/23 1245   Resp 10 03/29/23 1245   SpO2 97 % 03/29/23 1245   Vitals shown include unvalidated device data.      No case tracking events are documented in the log.      Pain/Arron Score: Arron Score: 8 (3/29/2023  9:50 AM)

## 2023-03-29 NOTE — NURSING
Nurses Note -- 4 Eyes      3/29/2023   1:17 PM      Skin assessed during: Admit      [x] No Pressure Injuries Present    []Prevention Measures Documented      [] Yes- Altered Skin Integrity Present or Discovered   [] LDA Added if Not in Epic (Describe Wound)   [] New Altered Skin Integrity was Present on Admit and Documented in LDA   [] Wound Image Taken    Wound Care Consulted? No    Attending Nurse:  Kyle Cox RN     Second RN/Staff Member:  Zara Temple RN

## 2023-03-29 NOTE — PLAN OF CARE
03/29/23 1631   Discharge Assessment   Assessment Type Discharge Planning Assessment   Confirmed/corrected address, phone number and insurance Yes   Confirmed Demographics Correct on Facesheet   Source of Information family   Reason For Admission Lumbar HNP S/P Discectomy   People in Home spouse   Do you expect to return to your current living situation? Yes   Do you have help at home or someone to help you manage your care at home? Yes   Who are your caregiver(s) and their phone number(s)? Yehuda spouse cell# [phone]   Prior to hospitilization cognitive status: Alert/Oriented   Current cognitive status: Unable to Assess   Home Layout Able to live on 1st floor   Equipment Currently Used at Home none   Who is going to help you get home at discharge? Spouse Yehuda cell# [phone]   How do you get to doctors appointments? family or friend will provide   Discharge Plan A Home with family   Discharge Plan discussed with: Spouse/sig other   Discharge Barriers Identified None     Pt asleep and did not awaken as she had rec'd pain medication.  Spouse Yehuda at bedside cell# [phone].  He states pt was independent in ADLs at home and did not use DME. No HH services.  States that pt could not drive R/T her eye that is patched- he or a friend would provide transportation .  PCP Dr. Walton in [place].   [facility] [place].  Physical Address is [address] , but receives mail at  [employer].

## 2023-03-29 NOTE — DISCHARGE INSTRUCTIONS
LUMBAR LAMINECTOMY/ LAMINOTOMY/ DISCECTOMY  DISCHARGE INSTRUCTIONS      1.   Keep your incision clean and dry.    If your sutures are under the skin, they will dissolve with time.  If your sutures or staples are over the skin, they will be removed at your first postoperative follow-up appointment in 10-14 days.   Wait at least 72 hours from the time of your surgery to take a shower.  After showering, pat your incision dry.  Do not immerse your incision in water for 4-6 weeks (e.g. bath tub, hot tub, swimming pool).      2.  Activity restrictions:  No lifting greater than 15 pounds for 4-6 weeks.  No bending, stooping, or twisting.  Avoid sitting in one position for over 30 minutes at a time.  No impact exercise or contact sports for at least 3 months.  To resume driving short distances (<30 minutes), you must be off of your narcotic medications and be able to comfortably brake suddenly, should the need arise.    Get up and walk.      3.  Contact your Neurosurgeon if the following occurs:  Signs and symptoms of infection, including fever above 101.5 degrees Fahrenheit and/or chills.  Redness, swelling, warmth, or drainage from the incision.  Any lasting changes in sensation, numbness, and/or tingling.  Increased weakness or increased pain.  Swelling of the foot and/or lower leg with calf pain.      Neurosurgery has office hours Monday through Friday, 8:00 AM to 4:00 PM except for holidays. There is an answering service available during non-office hours, with 24 hours neurosurgery coverage.  Report to the Emergency Department if you need immediate medical assistance.      Please contact Dr. Saucedo's office for any questions or concerns.  Typically, your first follow-up appointment after a lumbar laminectomy/ laminotomy/ disectomy is 10-14 days from the date of your operation if sutures or staples need to be removed.  Otherwise, the follow-up appointment is in 4-6 weeks.

## 2023-03-30 VITALS
HEIGHT: 62 IN | OXYGEN SATURATION: 100 % | WEIGHT: 132.06 LBS | DIASTOLIC BLOOD PRESSURE: 71 MMHG | TEMPERATURE: 98 F | SYSTOLIC BLOOD PRESSURE: 121 MMHG | RESPIRATION RATE: 18 BRPM | HEART RATE: 82 BPM | BODY MASS INDEX: 24.3 KG/M2

## 2023-03-30 PROCEDURE — 99024 PR POST-OP FOLLOW-UP VISIT: ICD-10-PCS | Mod: ,,, | Performed by: NEUROLOGICAL SURGERY

## 2023-03-30 PROCEDURE — 99024 POSTOP FOLLOW-UP VISIT: CPT | Mod: ,,, | Performed by: NEUROLOGICAL SURGERY

## 2023-03-30 PROCEDURE — 97161 PT EVAL LOW COMPLEX 20 MIN: CPT

## 2023-03-30 PROCEDURE — 25000003 PHARM REV CODE 250: Performed by: NEUROLOGICAL SURGERY

## 2023-03-30 RX ADMIN — PANTOPRAZOLE SODIUM 40 MG: 40 TABLET, DELAYED RELEASE ORAL at 08:03

## 2023-03-30 RX ADMIN — AMLODIPINE BESYLATE 10 MG: 5 TABLET ORAL at 08:03

## 2023-03-30 RX ADMIN — HYDROCODONE BITARTRATE AND ACETAMINOPHEN 1 TABLET: 10; 325 TABLET ORAL at 02:03

## 2023-03-30 RX ADMIN — CHOLECALCIFEROL TAB 25 MCG (1000 UNIT) 1000 UNITS: 25 TAB at 08:03

## 2023-03-30 RX ADMIN — HYDROCODONE BITARTRATE AND ACETAMINOPHEN 1 TABLET: 10; 325 TABLET ORAL at 08:03

## 2023-03-30 RX ADMIN — GABAPENTIN 600 MG: 300 CAPSULE ORAL at 08:03

## 2023-03-30 NOTE — NURSING
Complete DC instructions provided. Questions answered. Aware of follow ups. All belongings gathered by pt. Has scripts and specific DC instructions from Dr Saucedo. Aylin and pleasant

## 2023-03-30 NOTE — DISCHARGE SUMMARY
[facility]  Discharge Summary  Neurosurgery    Admit Date: 3/29/2023    Discharge Date and Time:  03/30/2023     Attending Physician: Luisa Saucedo MD     Discharge Physician: Luisa Saucedo    Reason for Admission:   Ms. Day is a 58 y.o. female who has a past medical history significant for hypertension, GERD, chronic low back pain, and osteoporosis.  She presented as a follow-up patient in the neurosurgery clinic for new onset of right leg pain and numbness for a duration of 6 weeks after standing up from a recliner.  The patient had a normal motor exam with decreased sensation in the right S1 dermatome along her right lateral lower leg and foot.     I reviewed pertinent imaging studies with the patient.  A MRI lumbar spine without gadolinium demonstrated normal alignment.  There was a large right L5-S1 disc herniation with neuroforaminal narrowing.  The disc extended behind S1.  I discussed with Ms. Day that her symptoms of right lower extremity radiculopathy had not significantly improved with optimization of medical management.  Her right leg pain and numbness has been disruptive to her daily activities.  Therefore, I recommended surgery, specifically a right L5-S1 diskectomy.  She understood that this surgery was anticipated to improve her right leg pain, but was not expected to change her chronic low back pain.   I reviewed the risks, benefits, and alternatives of this surgery.  The patient agreed to these risks and would like to proceed.  All questions were answered to her satisfaction.      Procedures Performed: Right L5-S1 diskectomy, 3/29/2023- Dr. Luisa Saucedo    [facility] Course: The patient was taken to the OR for a right L5-S1 diskectomy on 3/29/2023 and tolerated the procedure well.  After surgery, the patient was extubated and taken to recovery in stable condition.  After observation in recovery, the patient was transferred to the neurosurgical floor.     Postoperatively,  Ms. Day had significant improvement in her right leg pain.  She had a normal motor exam with decreased sensation in her right toes, although this was improved from her preoperative condition.  Physical therapy and occupational therapy evaluated the patient during her hospitalization.  She was ambulating without assistance.  At time of discharge the patient was neurologically stable, was afebrile, and vital signs were stable.  The patient was tolerating a diet and voiding without difficulty.  The patient is being discharged to home in good condition.    Discharge instructions were verbally discussed with the patient, including wound care and follow up appointments.  The patient was also given a discharge instruction sheet explaining the aforementioned discussion.  The patient verbalized understanding of instructions and agreed to the plan.      Consults: none    Final Diagnoses:  Right L5-S1 herniated disc with right lower extremity radiculopathy    Discharged Condition: good   Principal Problem: Herniated lumbar intervertebral disc   Secondary Diagnoses:   Active Hospital Problems    Diagnosis  POA    *Herniated lumbar intervertebral disc [M51.26]  Yes      Resolved Hospital Problems   No resolved problems to display.       Disposition: Home or Self Care    Diet: Regular as tolerated     Activity: As tolerated    Follow Up/Patient Instructions:     Arrangements have been made for the patient to follow up in Dr. MELVI Saucedo's neurosurgery clinic 2 weeks postoperatively on Thursday, 04/13/2023 at 2:30 p.m.    See patient instruction sheet for further details.    Medications:  Reconciled Home Medications. No new Rx were written:      Medication List        CONTINUE taking these medications      albuterol 2.5 mg /3 mL (0.083 %) nebulizer solution  Commonly known as: PROVENTIL  2 (two) times a day.     amitriptyline 25 MG tablet  Commonly known as: ELAVIL  Take 25 mg by mouth every evening.     amLODIPine 10 MG  tablet  Commonly known as: NORVASC  Take 1 tablet by mouth once daily.     ezetimibe 10 mg tablet  Commonly known as: ZETIA  Take 10 mg by mouth Daily.     gabapentin 600 MG tablet  Commonly known as: NEURONTIN  Take 1 tablet (600 mg total) by mouth 3 (three) times daily.     HYDROcodone-acetaminophen  mg per tablet  Commonly known as: NORCO  Take 1 tablet by mouth.     omeprazole 10 MG capsule  Commonly known as: PRILOSEC  Take 10 mg by mouth once daily.     ondansetron 8 MG tablet  Commonly known as: ZOFRAN  Take 8 mg by mouth every 8 (eight) hours as needed.     promethazine 50 MG tablet  Commonly known as: PHENERGAN  Take 25 mg by mouth as needed.     rosuvastatin 20 MG tablet  Commonly known as: CRESTOR  Take 20 mg by mouth Daily.     tiZANidine 4 MG tablet  Commonly known as: ZANAFLEX  Take 8 mg by mouth every evening.     vitamin D 1000 units Tab  Commonly known as: VITAMIN D3  Take 1,000 Units by mouth once daily.            STOP taking these medications      icosapent ethyL 1 gram Cap  Commonly known as: VASCEPA     vitamin E 100 UNIT capsule            No discharge procedures on file.   Follow-up Information       Luisa Saucedo MD. Go on 4/13/2023.    Specialty: Neurosurgery  Why: @230pm  Contact information:  [address] [place] [address] [address] Otto [place] [id number]  [phone]                             LUMBAR LAMINECTOMY/ LAMINOTOMY/ DISCECTOMY  DISCHARGE INSTRUCTIONS      1.   Keep your incision clean and dry.    If your sutures are under the skin, they will dissolve with time.  If your sutures or staples are over the skin, they will be removed at your first postoperative follow-up appointment in 10-14 days.   Wait at least 72 hours from the time of your surgery to take a shower.  After showering, pat your incision dry.  Do not immerse your incision in water for 4-6 weeks (e.g. bath tub, hot tub, swimming pool).      2.  Activity restrictions:  No lifting greater than 15 pounds for 4-6 weeks.  No bending,  stooping, or twisting.  Avoid sitting in one position for over 30 minutes at a time.  No impact exercise or contact sports for at least 3 months.  To resume driving short distances (<30 minutes), you must be off of your narcotic medications and be able to comfortably brake suddenly, should the need arise.    Get up and walk.      3.  Contact your Neurosurgeon if the following occurs:  Signs and symptoms of infection, including fever above 101.5 degrees Fahrenheit and/or chills.  Redness, swelling, warmth, or drainage from the incision.  Any lasting changes in sensation, numbness, and/or tingling.  Increased weakness or increased pain.  Swelling of the foot and/or lower leg with calf pain.      Neurosurgery has office hours Monday through Friday, 8:00 AM to 4:00 PM except for holidays. There is an answering service available during non-office hours, with 24 hours neurosurgery coverage.  Report to the Emergency Department if you need immediate medical assistance.      Please contact Dr. Saucedo's office for any questions or concerns.  Typically, your first follow-up appointment after a lumbar laminectomy/ laminotomy/ disectomy is 10-14 days from the date of your operation if sutures or staples need to be removed.  Otherwise, the follow-up appointment is in 4-6 weeks.

## 2023-03-30 NOTE — PT/OT/SLP EVAL
"Physical Therapy Evaluation and Discharge Note    Patient Name:  Roseann Day   MRN:  [id number]    Recommendations:     Discharge Recommendations: home  Discharge Equipment Recommendations: none   Barriers to discharge: None    Assessment:     Roseann Day is a 58 y.o. female admitted with a medical diagnosis of Herniated lumbar intervertebral disc s/p R L5-S1 discectomy .  At this time, patient is functioning at their prior level of function and does not require further acute PT services.     Recent Surgery: Procedure(s) (LRB):  DISCECTOMY, SPINE, LUMBAR (Right) 1 Day Post-Op    Plan:     During this hospitalization, patient does not require further acute PT services.  Please re-consult if situation changes.      Subjective     Chief Complaint: mild back pain "I think its time for my medicine"  Patient/Family Comments/goals: return home today  Pain/Comfort:  Pain Rating 1: 6/10  Location - Orientation 1: lower  Location 1: back  Pain Addressed 1: Nurse notified  Pain Rating Post-Intervention 1: 6/10    Patients cultural, spiritual, Episcopal conflicts given the current situation: no    Living Environment:  Pt lives at home with  in single level home with no steps to enter. She denies use of AD and reports Ind with mobility and driving prior to injury.  Equipment used at home: none.  DME owned (not currently used): none.  Upon discharge, patient will have assistance from family.    Objective:     Communicated with nurse prior to session.  Patient found left sidelying with peripheral IV upon PT entry to room.    General Precautions: Standard,      Orthopedic Precautions:spinal precautions   Braces: N/A  Respiratory Status: Room air    Exams:  Cognitive Exam:  Patient is oriented to Person, Place, Time, and Situation  Gross Motor Coordination:  WFL  RLE ROM: WNL  RLE Strength: WNL  LLE ROM: WNL  LLE Strength: WNL    Functional Mobility:  Bed Mobility:     Rolling Right: " independence  Scooting: independence  Supine to Sit: independence  Transfers:     Sit to Stand:  independence with no AD  Bed to Chair: independence with  no AD  using  Step Transfer  Gait: 250ft Ind without AD. Steady bianca with symmetrical step length and normal SANDRA. No LOB or SOB occured  Balance: Good    AM-PAC 6 CLICK MOBILITY  Total Score:24       Treatment and Education:  Pt educated on importance of daily mobility and ambulation    AM-PAC 6 CLICK MOBILITY  Total Score:24     Patient left sitting edge of bed with all lines intact and call button in reach.    GOALS:   Multidisciplinary Problems       Physical Therapy Goals       Not on file                    History:     Past Medical History:   Diagnosis Date    Coronary artery disease     Displacement of lumbar intervertebral disc     GERD (gastroesophageal reflux disease)     Hyperlipidemia     Hypertension     Osteoporosis     PONV (postoperative nausea and vomiting)     Sciatica of right side        Past Surgical History:   Procedure Laterality Date    apendix      APPENDECTOMY      CARPAL TUNNEL RELEASE Bilateral     CHOLECYSTECTOMY      COLONOSCOPY  04/04/2019    FUNDOPLICATION, NISSEN, PEDIATRIC      GALLBLADDER SURGERY      HERNIA REPAIR      HYSTERECTOMY      SINUS SURGERY      TONSILLECTOMY      ULNAR TUNNEL RELEASE Bilateral        Time Tracking:     PT Received On: 03/30/23  PT Start Time: 0850     PT Stop Time: 0902  PT Total Time (min): 12 min     Billable Minutes: Evaluation (low)      03/30/2023

## 2023-04-01 ENCOUNTER — PATIENT MESSAGE (OUTPATIENT)
Dept: ADMINISTRATIVE | Facility: OTHER | Age: 58
End: 2023-04-01
Payer: COMMERCIAL

## 2023-04-02 ENCOUNTER — PATIENT MESSAGE (OUTPATIENT)
Dept: ADMINISTRATIVE | Facility: OTHER | Age: 58
End: 2023-04-02
Payer: COMMERCIAL

## 2023-04-04 ENCOUNTER — PATIENT OUTREACH (OUTPATIENT)
Dept: ADMINISTRATIVE | Facility: CLINIC | Age: 58
End: 2023-04-04
Payer: COMMERCIAL

## 2023-04-04 NOTE — PROGRESS NOTES
C3 nurse spoke with Roseann Day   for a TCC post hospital discharge follow up call. The patient has a scheduled HOSFU appointment with Sawyer Anna II, MD   on 04/13/2023 @ 2:30PM.

## 2023-04-18 ENCOUNTER — OFFICE VISIT (OUTPATIENT)
Dept: NEUROSURGERY | Facility: CLINIC | Age: 58
End: 2023-04-18
Payer: COMMERCIAL

## 2023-04-18 VITALS
SYSTOLIC BLOOD PRESSURE: 134 MMHG | BODY MASS INDEX: 23.74 KG/M2 | WEIGHT: 129 LBS | DIASTOLIC BLOOD PRESSURE: 76 MMHG | RESPIRATION RATE: 20 BRPM | TEMPERATURE: 98 F | HEART RATE: 86 BPM | HEIGHT: 62 IN

## 2023-04-18 DIAGNOSIS — M51.26 DISC DISPLACEMENT, LUMBAR: Primary | ICD-10-CM

## 2023-04-18 PROCEDURE — 1160F PR REVIEW ALL MEDS BY PRESCRIBER/CLIN PHARMACIST DOCUMENTED: ICD-10-PCS | Mod: CPTII,,, | Performed by: PHYSICIAN ASSISTANT

## 2023-04-18 PROCEDURE — 99024 POSTOP FOLLOW-UP VISIT: CPT | Mod: ,,, | Performed by: PHYSICIAN ASSISTANT

## 2023-04-18 PROCEDURE — 1159F PR MEDICATION LIST DOCUMENTED IN MEDICAL RECORD: ICD-10-PCS | Mod: CPTII,,, | Performed by: PHYSICIAN ASSISTANT

## 2023-04-18 PROCEDURE — 1160F RVW MEDS BY RX/DR IN RCRD: CPT | Mod: CPTII,,, | Performed by: PHYSICIAN ASSISTANT

## 2023-04-18 PROCEDURE — 3008F BODY MASS INDEX DOCD: CPT | Mod: CPTII,,, | Performed by: PHYSICIAN ASSISTANT

## 2023-04-18 PROCEDURE — 3075F SYST BP GE 130 - 139MM HG: CPT | Mod: CPTII,,, | Performed by: PHYSICIAN ASSISTANT

## 2023-04-18 PROCEDURE — 3075F PR MOST RECENT SYSTOLIC BLOOD PRESS GE 130-139MM HG: ICD-10-PCS | Mod: CPTII,,, | Performed by: PHYSICIAN ASSISTANT

## 2023-04-18 PROCEDURE — 3078F DIAST BP <80 MM HG: CPT | Mod: CPTII,,, | Performed by: PHYSICIAN ASSISTANT

## 2023-04-18 PROCEDURE — 1159F MED LIST DOCD IN RCRD: CPT | Mod: CPTII,,, | Performed by: PHYSICIAN ASSISTANT

## 2023-04-18 PROCEDURE — 3008F PR BODY MASS INDEX (BMI) DOCUMENTED: ICD-10-PCS | Mod: CPTII,,, | Performed by: PHYSICIAN ASSISTANT

## 2023-04-18 PROCEDURE — 3078F PR MOST RECENT DIASTOLIC BLOOD PRESSURE < 80 MM HG: ICD-10-PCS | Mod: CPTII,,, | Performed by: PHYSICIAN ASSISTANT

## 2023-04-18 PROCEDURE — 99024 PR POST-OP FOLLOW-UP VISIT: ICD-10-PCS | Mod: ,,, | Performed by: PHYSICIAN ASSISTANT

## 2023-04-18 NOTE — PROGRESS NOTES
[facility]  History & Physical  Neurosurgery      Roseann Day   [id number]   1965       SUBJECTIVE:       HPI:  Roseann Day is a 58 y.o. female who presents for 2 week postoperative follow up appointment.  On 03/29/2023, the patient underwent right L5-S1 diskectomy with Dr. Saucedo.  She has experienced a resolution of the right lower extremity pain since surgery.  Her incisional pain is well controlled.  She is back to her baseline lower back pain.  She continues with numbness at the 3rd, 4th, and 5th toes on the right.  There is also numbness of the right posterior lower leg.  She does not feel as though she has weakness in either lower extremity.  She is tolerated increasing her level of activity.      Past Medical History:   Diagnosis Date    Coronary artery disease     Displacement of lumbar intervertebral disc     GERD (gastroesophageal reflux disease)     Hyperlipidemia     Hypertension     Osteoporosis     PONV (postoperative nausea and vomiting)     Sciatica of right side        Past Surgical History:   Procedure Laterality Date    apendix      APPENDECTOMY      CARPAL TUNNEL RELEASE Bilateral     CHOLECYSTECTOMY      COLONOSCOPY  04/04/2019    FUNDOPLICATION, NISSEN, PEDIATRIC      GALLBLADDER SURGERY      HERNIA REPAIR      HYSTERECTOMY      LUMBAR DISCECTOMY Right 03/29/2023    L5-S1 discectomy.  Dr. Saucedo    SINUS SURGERY      TONSILLECTOMY      ULNAR TUNNEL RELEASE Bilateral        Family History   Problem Relation Age of Onset    Heart disease Mother     Diabetes Mother     Colon cancer Neg Hx     Esophageal cancer Neg Hx        Social History     Socioeconomic History    Marital status:    Tobacco Use    Smoking status: Every Day     Types: Vaping with nicotine    Smokeless tobacco: Never   Substance and Sexual Activity    Alcohol use: Yes     Comment: OCCASSIONAL    Drug use: Never    Sexual activity: Yes       Review of patient's allergies indicates:  No  "Known Allergies     Current Outpatient Medications   Medication Instructions    albuterol (PROVENTIL) 2.5 mg /3 mL (0.083 %) nebulizer solution 2 times daily    amitriptyline (ELAVIL) 25 mg, Oral, Nightly    amLODIPine (NORVASC) 10 MG tablet 1 tablet, Oral, Daily    ezetimibe (ZETIA) 10 mg, Oral, Daily    gabapentin (NEURONTIN) 600 mg, Oral, 3 times daily    HYDROcodone-acetaminophen (NORCO)  mg per tablet 1 tablet, Oral    omeprazole (PRILOSEC) 10 mg, Oral, Daily    ondansetron (ZOFRAN) 8 mg, Oral, Every 8 hours PRN    promethazine (PHENERGAN) 25 mg, Oral, As needed (PRN)    rosuvastatin (CRESTOR) 20 mg, Oral, Daily    tiZANidine (ZANAFLEX) 8 mg, Oral, Nightly    vitamin D (VITAMIN D3) 1,000 Units, Oral, Daily         ROS:    Review of Systems  12 point review of systems conducted, negative except as stated in the history of present illness. See HPI for details.    OBJECTIVE:     Physical Examination:    Vital Signs:  Visit Vitals  /76 (BP Location: Other (Comment), Patient Position: Sitting)   Pulse 86   Temp 97.6 °F (36.4 °C)   Resp 20   Ht 5' 2" (1.575 m)   Wt 58.5 kg (129 lb)   BMI 23.59 kg/m²        General:  Pleasant, Well-nourished, Well-groomed.    Lungs:  Breathing is quiet, nonlabored.    Musculoskeletal:   Lumbar incision is well healed.  There was a stitch poking out which was removed.    Neurological:    Muscle strength against resistance:    Iliopsoas Quadriceps Knee  Flexion Tibialis  anterior Gastro- cnemius EHL   Lower: R 5/5 5/5 5/5 5/5 5/5     L 5/5 5/5 5/5 5/5 5/5      Gait is normal.    Coordination:  Within normal limits      ASSESSMENT:     1. Disc displacement, lumbar            PLAN:     Overall, the patient is doing very well.  She is to see Dr. Rapp in the next few days.  We discussed a gradual increase in her level of activity.  She will return for follow-up to see Dr. Saucedo at 6 weeks postop.      Ivelisse Freitas PA-C    "

## 2023-05-10 NOTE — PROGRESS NOTES
[facility]   Neurosurgery      Roseann Day  MRN: [id number], CSN: [id number]      : 1965   Age: 58 y.o. female  Payor: [facility] / Plan: [place] [place] PPO / Product Type: PPO /       Ref:  No referring provider defined for this encounter.    PCP: Sawyer Anna II, MD    Visit Date: 2023     Patient Active Problem List   Diagnosis    Herniated lumbar intervertebral disc       SUBJECTIVE:      CC:   Chief Complaint   Patient presents with    Improvement in low back and R leg pain postop       HPI:   Ms. Day is a 58 y.o. female who has a past medical history significant for hypertension, GERD, chronic low back pain, and osteoporosis.  She is 6 weeks postoperative s/p a right L5-S1 diskectomy on 2023.  Postoperatively, the patient has resolution of her right leg pain with moderate, chronic low back pain and residual numbness in her right lower extremity.    Ms. Day was most recently evaluated in the neurosurgery clinic on 2023 by MARIXA Freitas.  The patient was doing very well at this visit and recommended to advance her activity as tolerated.  She was recommended to follow up with her established pain management specialist Dr. Rapp.     The patient is happy to report that her low back pain is back to its baseline of 4/10 with complete resolution of her right leg pain postoperatively.  She has some residual numbness in her right lower lateral leg, lateral right foot and toes.  Ms. Day does not have any weakness.  The patient has been fully ambulatory.  Her wound has been healing very well with no fevers, redness, or drainage.    She was evaluated by her established pain management specialist Dr. Rapp recently.  He continues to monitor her pain medications, which include Norco 10/325 with a frequency of 3 times a day, which the patient has been taking for many years.  In addition, she takes Neurontin 400 mg twice a day, as higher doses  have been associated with adverse side effects including sleepiness.    Ms. Day is looking forward to being released for full activity.  She continues to smoke 1/2 pack per day.  In regards to her right eye surgery, the patient is scheduled to proceed with an eye specialist in [place] in June 2023.  In the meantime, Ms. Day has not been working as a  since December 2022, as she has been recovering from her medical conditions.       Patient Active Problem List    Diagnosis Date Noted    Herniated lumbar intervertebral disc 03/29/2023     Past Medical History:   Diagnosis Date    Coronary artery disease     Displacement of lumbar intervertebral disc     GERD (gastroesophageal reflux disease)     Hyperlipidemia     Hypertension     Osteoporosis     PONV (postoperative nausea and vomiting)     Sciatica of right side      Past Surgical History:   Procedure Laterality Date    apendix      APPENDECTOMY      CARPAL TUNNEL RELEASE Bilateral     CHOLECYSTECTOMY      COLONOSCOPY  04/04/2019    FUNDOPLICATION, NISSEN, PEDIATRIC      GALLBLADDER SURGERY      HERNIA REPAIR      HYSTERECTOMY      LUMBAR DISCECTOMY Right 03/29/2023    L5-S1 discectomy.  Dr. aSucedo    SINUS SURGERY      TONSILLECTOMY      ULNAR TUNNEL RELEASE Bilateral        Current Outpatient Medications:     albuterol (PROVENTIL) 2.5 mg /3 mL (0.083 %) nebulizer solution, 2 (two) times a day., Disp: , Rfl:     amitriptyline (ELAVIL) 25 MG tablet, Take 25 mg by mouth every evening., Disp: , Rfl:     amLODIPine (NORVASC) 10 MG tablet, Take 1 tablet by mouth once daily., Disp: , Rfl:     budesonide (PULMICORT) 0.5 mg/2 mL nebulizer solution, Take by nebulization., Disp: , Rfl:     ezetimibe (ZETIA) 10 mg tablet, Take 10 mg by mouth Daily., Disp: , Rfl:     gabapentin (NEURONTIN) 600 MG tablet, Take 1 tablet (600 mg total) by mouth 3 (three) times daily., Disp: 90 tablet, Rfl: 1    HYDROcodone-acetaminophen (NORCO)  mg per tablet, Take 1 tablet  "by mouth., Disp: , Rfl:     icosapent ethyL (VASCEPA) 1 gram Cap, Take 1 capsule by mouth 2 (two) times daily., Disp: , Rfl:     omeprazole (PRILOSEC) 10 MG capsule, Take 10 mg by mouth once daily., Disp: , Rfl:     ondansetron (ZOFRAN) 8 MG tablet, Take 8 mg by mouth every 8 (eight) hours as needed., Disp: , Rfl:     promethazine (PHENERGAN) 50 MG tablet, Take 25 mg by mouth as needed., Disp: , Rfl:     rosuvastatin (CRESTOR) 20 MG tablet, Take 20 mg by mouth Daily., Disp: , Rfl:     tiZANidine (ZANAFLEX) 4 MG tablet, Take 8 mg by mouth every evening., Disp: , Rfl:     vitamin D (VITAMIN D3) 1000 units Tab, Take 1,000 Units by mouth once daily., Disp: , Rfl:     Review of patient's allergies indicates:  No Known Allergies    Social History     Tobacco Use    Smoking status: Every Day     Types: Vaping with nicotine    Smokeless tobacco: Never   Substance Use Topics    Alcohol use: Yes     Comment: OCCASSIONAL     Occupation:  Last worked in December 2022 before having sinus surgery.    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       Family History   Problem Relation Age of Onset    Heart disease Mother     Diabetes Mother     Colon cancer Neg Hx     Esophageal cancer Neg Hx        ROS:  Constitutional:  Negative for chills and fever.   HENT:  Negative for congestion and sore throat.    Eyes:  Positive for blurred vision and double vision.   Respiratory:  Negative for cough and shortness of breath.    Cardiovascular:  Negative for chest pain and palpitations.   Gastrointestinal:  Negative for constipation, diarrhea, nausea and vomiting.   Musculoskeletal:  Positive for back pain, Negative for neck pain.   Neurological:  Positive for sensory change, Negative for focal weakness and headaches.   Endo/Heme/Allergies:  Does not bruise/bleed easily.   Psychiatric/Behavioral:  Negative for depression and anxiety.      OBJECTIVE:     EXAMINATION:  /68   Pulse 65   Resp 16   Ht 5' 2" " (1.575 m)   Wt 58.2 kg (128 lb 6.4 oz)   BMI 23.48 kg/m²   Body Habitus: Normal    Physical Exam:  Constitutional: The patient is well-developed and cooperative, sitting comfortably in a chair with a R eye patch in place     Mental Status:   Oriented to person, place, and time  Normal speech     Motor:  Muscle bulk: normal in all extremities  Tone: normal in all extremities      Lower extremities:  Hip flexors: right 5/5; left 5/5  Knee extensors: right 5/5; left 5/5  Knee flexors: right 5/5; left 5/5  Foot dorsiflexors: right 5/5; left 5/5  Foot plantar flexors: right 5/5; left 5/5  Extensor hallucis longus: right 5/5; left 5/5     Sensation:  Normal to light touch in b LE  except decreased to light touch in right lateral lower leg, right lateral foot and lateral toes     Reflexes:  Butts sign: right negative; left negative  Babinski: right downgoing; left downgoing  Clonus: right negative; left negative     Musculoskeletal:     Gait: normal     Straight leg test: right negative; left negative     Upper back: No pain with palpation  Lower back: No pain with palpation     Wound- well healed midline scar      ASSESSMENT:  Ms. Day is a 58 y.o. female who has a past medical history significant for hypertension, GERD, chronic low back pain, and osteoporosis.  She is 6 weeks postoperative s/p a right L5-S1 diskectomy on 03/29/2023.  Postoperatively, the patient has resolution of her right leg pain with moderate, chronic low back pain and residual numbness in her right lower extremity.  Ms. Day has a normal motor exam with decreased sensation in her right lower lateral leg and lateral foot.         PLAN:    Encounter Diagnosis   Name Primary?    Herniated lumbar intervertebral disc Yes     No orders of the defined types were placed in this encounter.       1.  Ms. Day was reassured that she is making a remarkable recovery postoperatively.  The patient is very happy with her postoperative course.    2.   She is cleared to gradually advance her activity as tolerated without any restrictions.      3.  The patient will continue to follow with her established pain management specialist Dr. Rapp as scheduled in July 2023.  He will continue with monitoring and prescribing her Norco 10/325 and Neurontin.     4.  We discussed smoking cessation goals again, as nicotine inhibits tissue healing.    5.  Ms. Day is encouraged to follow up in the neurosurgery clinic on an as-needed basis for any concerning changes in condition or symptoms.        This note will be sent to the patient's referring provider No ref. provider found and primary care provider Sawyer Anna II, MD.           Luisa Saucedo MD  Neurosurgeon

## 2023-05-11 ENCOUNTER — OFFICE VISIT (OUTPATIENT)
Dept: NEUROSURGERY | Facility: CLINIC | Age: 58
End: 2023-05-11
Payer: COMMERCIAL

## 2023-05-11 VITALS
WEIGHT: 128.38 LBS | RESPIRATION RATE: 16 BRPM | HEART RATE: 65 BPM | HEIGHT: 62 IN | SYSTOLIC BLOOD PRESSURE: 104 MMHG | BODY MASS INDEX: 23.62 KG/M2 | DIASTOLIC BLOOD PRESSURE: 68 MMHG

## 2023-05-11 DIAGNOSIS — M51.26 HERNIATED LUMBAR INTERVERTEBRAL DISC: Primary | ICD-10-CM

## 2023-05-11 PROCEDURE — 3074F PR MOST RECENT SYSTOLIC BLOOD PRESSURE < 130 MM HG: ICD-10-PCS | Mod: CPTII,,, | Performed by: NEUROLOGICAL SURGERY

## 2023-05-11 PROCEDURE — 3008F PR BODY MASS INDEX (BMI) DOCUMENTED: ICD-10-PCS | Mod: CPTII,,, | Performed by: NEUROLOGICAL SURGERY

## 2023-05-11 PROCEDURE — 99024 POSTOP FOLLOW-UP VISIT: CPT | Mod: ,,, | Performed by: NEUROLOGICAL SURGERY

## 2023-05-11 PROCEDURE — 1160F RVW MEDS BY RX/DR IN RCRD: CPT | Mod: CPTII,,, | Performed by: NEUROLOGICAL SURGERY

## 2023-05-11 PROCEDURE — 3074F SYST BP LT 130 MM HG: CPT | Mod: CPTII,,, | Performed by: NEUROLOGICAL SURGERY

## 2023-05-11 PROCEDURE — 3078F PR MOST RECENT DIASTOLIC BLOOD PRESSURE < 80 MM HG: ICD-10-PCS | Mod: CPTII,,, | Performed by: NEUROLOGICAL SURGERY

## 2023-05-11 PROCEDURE — 1159F MED LIST DOCD IN RCRD: CPT | Mod: CPTII,,, | Performed by: NEUROLOGICAL SURGERY

## 2023-05-11 PROCEDURE — 99024 PR POST-OP FOLLOW-UP VISIT: ICD-10-PCS | Mod: ,,, | Performed by: NEUROLOGICAL SURGERY

## 2023-05-11 PROCEDURE — 1160F PR REVIEW ALL MEDS BY PRESCRIBER/CLIN PHARMACIST DOCUMENTED: ICD-10-PCS | Mod: CPTII,,, | Performed by: NEUROLOGICAL SURGERY

## 2023-05-11 PROCEDURE — 3008F BODY MASS INDEX DOCD: CPT | Mod: CPTII,,, | Performed by: NEUROLOGICAL SURGERY

## 2023-05-11 PROCEDURE — 3078F DIAST BP <80 MM HG: CPT | Mod: CPTII,,, | Performed by: NEUROLOGICAL SURGERY

## 2023-05-11 PROCEDURE — 1159F PR MEDICATION LIST DOCUMENTED IN MEDICAL RECORD: ICD-10-PCS | Mod: CPTII,,, | Performed by: NEUROLOGICAL SURGERY

## 2023-05-11 RX ORDER — ICOSAPENT ETHYL 1000 MG/1
1 CAPSULE ORAL 2 TIMES DAILY
COMMUNITY
Start: 2023-04-13

## 2023-05-11 RX ORDER — BUDESONIDE 0.5 MG/2ML
INHALANT ORAL
COMMUNITY
Start: 2023-04-28

## 2023-05-11 NOTE — PATIENT INSTRUCTIONS
Ms. Day is a 58 y.o. female who has a past medical history significant for hypertension, GERD, chronic low back pain, and osteoporosis.  She is 6 weeks postoperative s/p a right L5-S1 diskectomy on 03/29/2023.  Postoperatively, the patient has resolution of her right leg pain with moderate, chronic low back pain and residual numbness in her right lower extremity.  Ms. Day has a normal motor exam with decreased sensation in her right lower lateral leg and lateral foot.         PLAN:    Encounter Diagnosis   Name Primary?    Herniated lumbar intervertebral disc Yes     No orders of the defined types were placed in this encounter.       1.  Ms. Day was reassured that she is making a remarkable recovery postoperatively.  The patient is very happy with her postoperative course.    2.  She is cleared to gradually advance her activity as tolerated without any restrictions.      3.  The patient will continue to follow with her established pain management specialist Dr. Rapp as scheduled in July 2023.  He will continue with monitoring and prescribing her Norco 10/325 and Neurontin.     4.  We discussed smoking cessation goals again, as nicotine inhibits tissue healing.    5.  Ms. Day is encouraged to follow up in the neurosurgery clinic on an as-needed basis for any concerning changes in condition or symptoms.        This note will be sent to the patient's referring provider No ref. provider found and primary care provider Sawyer Anna II, MD.

## 2024-12-19 DIAGNOSIS — R23.3 ECCHYMOSES, SPONTANEOUS: Primary | ICD-10-CM

## 2025-02-04 ENCOUNTER — OFFICE VISIT (OUTPATIENT)
Dept: HEMATOLOGY/ONCOLOGY | Facility: CLINIC | Age: 60
End: 2025-02-04
Payer: COMMERCIAL

## 2025-02-04 VITALS
HEIGHT: 62 IN | OXYGEN SATURATION: 94 % | DIASTOLIC BLOOD PRESSURE: 70 MMHG | BODY MASS INDEX: 26.29 KG/M2 | HEART RATE: 86 BPM | SYSTOLIC BLOOD PRESSURE: 109 MMHG | WEIGHT: 142.88 LBS | TEMPERATURE: 98 F | RESPIRATION RATE: 14 BRPM

## 2025-02-04 DIAGNOSIS — R23.3 ECCHYMOSES, SPONTANEOUS: ICD-10-CM

## 2025-02-04 LAB
APTT PPP: 28.4 SECONDS (ref 23.2–33.7)
BASOPHILS # BLD AUTO: 0.04 X10(3)/MCL
BASOPHILS NFR BLD AUTO: 0.4 %
EOSINOPHIL # BLD AUTO: 0.7 X10(3)/MCL (ref 0–0.9)
EOSINOPHIL NFR BLD AUTO: 7.5 %
ERYTHROCYTE [DISTWIDTH] IN BLOOD BY AUTOMATED COUNT: 14.1 % (ref 11.5–17)
HCT VFR BLD AUTO: 42.2 % (ref 37–47)
HGB BLD-MCNC: 13.7 G/DL (ref 12–16)
IMM GRANULOCYTES # BLD AUTO: 0.01 X10(3)/MCL (ref 0–0.04)
IMM GRANULOCYTES NFR BLD AUTO: 0.1 %
INR PPP: 1
LYMPHOCYTES # BLD AUTO: 2.68 X10(3)/MCL (ref 0.6–4.6)
LYMPHOCYTES NFR BLD AUTO: 28.8 %
MCH RBC QN AUTO: 30.6 PG (ref 27–31)
MCHC RBC AUTO-ENTMCNC: 32.5 G/DL (ref 33–36)
MCV RBC AUTO: 94.4 FL (ref 80–94)
MONOCYTES # BLD AUTO: 0.75 X10(3)/MCL (ref 0.1–1.3)
MONOCYTES NFR BLD AUTO: 8.1 %
NEUTROPHILS # BLD AUTO: 5.11 X10(3)/MCL (ref 2.1–9.2)
NEUTROPHILS NFR BLD AUTO: 55.1 %
PLATELET # BLD AUTO: 291 X10(3)/MCL (ref 130–400)
PMV BLD AUTO: 11.3 FL (ref 7.4–10.4)
PROTHROMBIN TIME: 13 SECONDS (ref 12.5–14.5)
RBC # BLD AUTO: 4.47 X10(6)/MCL (ref 4.2–5.4)
WBC # BLD AUTO: 9.29 X10(3)/MCL (ref 4.5–11.5)

## 2025-02-04 PROCEDURE — 99204 OFFICE O/P NEW MOD 45 MIN: CPT | Mod: S$GLB,,, | Performed by: INTERNAL MEDICINE

## 2025-02-04 PROCEDURE — 36415 COLL VENOUS BLD VENIPUNCTURE: CPT | Performed by: INTERNAL MEDICINE

## 2025-02-04 PROCEDURE — 1160F RVW MEDS BY RX/DR IN RCRD: CPT | Mod: CPTII,S$GLB,, | Performed by: INTERNAL MEDICINE

## 2025-02-04 PROCEDURE — 85730 THROMBOPLASTIN TIME PARTIAL: CPT | Performed by: INTERNAL MEDICINE

## 2025-02-04 PROCEDURE — 1159F MED LIST DOCD IN RCRD: CPT | Mod: CPTII,S$GLB,, | Performed by: INTERNAL MEDICINE

## 2025-02-04 PROCEDURE — 3078F DIAST BP <80 MM HG: CPT | Mod: CPTII,S$GLB,, | Performed by: INTERNAL MEDICINE

## 2025-02-04 PROCEDURE — 85025 COMPLETE CBC W/AUTO DIFF WBC: CPT | Performed by: INTERNAL MEDICINE

## 2025-02-04 PROCEDURE — 3008F BODY MASS INDEX DOCD: CPT | Mod: CPTII,S$GLB,, | Performed by: INTERNAL MEDICINE

## 2025-02-04 PROCEDURE — 99999 PR PBB SHADOW E&M-EST. PATIENT-LVL V: CPT | Mod: PBBFAC,,, | Performed by: INTERNAL MEDICINE

## 2025-02-04 PROCEDURE — 3074F SYST BP LT 130 MM HG: CPT | Mod: CPTII,S$GLB,, | Performed by: INTERNAL MEDICINE

## 2025-02-04 PROCEDURE — 85610 PROTHROMBIN TIME: CPT | Performed by: INTERNAL MEDICINE

## 2025-02-04 RX ORDER — GABAPENTIN 400 MG/1
400 CAPSULE ORAL 3 TIMES DAILY
COMMUNITY

## 2025-02-04 NOTE — PROGRESS NOTES
"  Referring physician: Dr. Sawyer Anna  Reason for referral: Easy Bruising      Subjective:       Patient ID: Roseann Day is a 60 y.o. female.    Easy Bruising--Diagnosed 12/2024    Chief Complaint: Other Misc (NPH)    HPI  61 yo female with PMH tobacco abuse, microscopic colitis on prolonged steroid treatment in the past year, PVD, CAD, COPD, chronic back pain presented to PCP c/o some easy bruising. She was noted to have multiple bruises mostly on forearms. Platelet count and CBC noted to be normal in 10/2024. Patient has been referred to me for evaluation. She reports that she does take Advil very regularly, also has problems with her LE, and wounds or injuries that take a long time to heal. Mentions that she does see a cardiologist and is known to have "blockages" in her legs. But she is not on any ASA or Plavix. She is not a drinker, but a long-time heavy smoker. She is on also inhaled steroids. She has never had any bleeding problems with any surgeries and no FH of any bleeding disorder.     Past Medical History:   Diagnosis Date    Coronary artery disease     Displacement of lumbar intervertebral disc     GERD (gastroesophageal reflux disease)     Hyperlipidemia     Hypertension     Osteoporosis     PONV (postoperative nausea and vomiting)     Sciatica of right side       Past Surgical History:   Procedure Laterality Date    apendix      APPENDECTOMY      CARPAL TUNNEL RELEASE Bilateral     CHOLECYSTECTOMY      COLONOSCOPY  04/04/2019    FUNDOPLICATION, NISSEN, PEDIATRIC      GALLBLADDER SURGERY      HERNIA REPAIR      HYSTERECTOMY      LUMBAR DISCECTOMY Right 03/29/2023    L5-S1 discectomy.  Dr. Saucedo    SINUS SURGERY      TONSILLECTOMY      ULNAR TUNNEL RELEASE Bilateral      Family History   Problem Relation Name Age of Onset    Heart disease Mother      Diabetes Mother      Colon cancer Neg Hx      Esophageal cancer Neg Hx       Social History     Socioeconomic History    Marital status: "    Tobacco Use    Smoking status: Every Day     Types: Vaping with nicotine    Smokeless tobacco: Never   Substance and Sexual Activity    Alcohol use: Yes     Comment: OCCASSIONAL    Drug use: Never    Sexual activity: Yes       Review of patient's allergies indicates:  No Known Allergies   Current Outpatient Medications on File Prior to Visit   Medication Sig Dispense Refill    amitriptyline (ELAVIL) 25 MG tablet Take 25 mg by mouth every evening.      amLODIPine (NORVASC) 10 MG tablet Take 1 tablet by mouth once daily.      ezetimibe (ZETIA) 10 mg tablet Take 10 mg by mouth Daily.      gabapentin (NEURONTIN) 400 MG capsule Take 400 mg by mouth 3 (three) times daily.      HYDROcodone-acetaminophen (NORCO)  mg per tablet Take 1 tablet by mouth.      omeprazole (PRILOSEC) 10 MG capsule Take 10 mg by mouth once daily.      ondansetron (ZOFRAN) 8 MG tablet Take 8 mg by mouth every 8 (eight) hours as needed.      promethazine (PHENERGAN) 50 MG tablet Take 25 mg by mouth as needed.      rosuvastatin (CRESTOR) 20 MG tablet Take 20 mg by mouth Daily.      tiZANidine (ZANAFLEX) 4 MG tablet Take 8 mg by mouth every evening.      vitamin D (VITAMIN D3) 1000 units Tab Take 1,000 Units by mouth once daily.      [DISCONTINUED] gabapentin (NEURONTIN) 600 MG tablet Take 1 tablet (600 mg total) by mouth 3 (three) times daily. 90 tablet 1    albuterol (PROVENTIL) 2.5 mg /3 mL (0.083 %) nebulizer solution 2 (two) times a day. (Patient not taking: Reported on 2/4/2025)      budesonide (PULMICORT) 0.5 mg/2 mL nebulizer solution Take by nebulization. (Patient not taking: Reported on 2/4/2025)      icosapent ethyL (VASCEPA) 1 gram Cap Take 1 capsule by mouth 2 (two) times daily. (Patient not taking: Reported on 2/4/2025)       No current facility-administered medications on file prior to visit.      Review of Systems   Constitutional:  Negative for appetite change and unexpected weight change.   HENT:  Negative for mouth  "sores.    Eyes:  Negative for visual disturbance.   Respiratory:  Positive for cough.    Cardiovascular:  Negative for chest pain.   Gastrointestinal:  Negative for abdominal pain and diarrhea.   Genitourinary:  Negative for frequency.   Musculoskeletal:  Positive for back pain.   Integumentary:  Negative for rash.        +bruising arms   Neurological:  Positive for headaches.   Hematological:  Negative for adenopathy.   Psychiatric/Behavioral:  The patient is not nervous/anxious.               Vitals:    02/04/25 1344   BP: 109/70   Pulse: 86   Resp: 14   Temp: 98 °F (36.7 °C)   TempSrc: Oral   SpO2: (!) 94%   Weight: 64.8 kg (142 lb 14.4 oz)   Height: 5' 2" (1.575 m)      Physical Exam  Constitutional:       Comments: Pleasant female, appears older than her stated age   HENT:      Head: Normocephalic.      Nose: Nose normal.      Mouth/Throat:      Mouth: Mucous membranes are moist.      Comments: +poor dentition  Eyes:      Extraocular Movements: Extraocular movements intact.      Conjunctiva/sclera: Conjunctivae normal.   Cardiovascular:      Rate and Rhythm: Normal rate and regular rhythm.   Pulmonary:      Effort: Pulmonary effort is normal.      Breath sounds: Normal breath sounds.   Abdominal:      General: Bowel sounds are normal. There is no distension.      Palpations: Abdomen is soft.      Tenderness: There is no abdominal tenderness.   Musculoskeletal:         General: Normal range of motion.      Comments: Hyperpigmentation of skin on LE, c/w venous stasis changes, poor circulation   Skin:     General: Skin is warm.      Comments: Forearms with petechiae and purpura c/w senile pupura   Neurological:      General: No focal deficit present.      Mental Status: She is alert and oriented to person, place, and time.   Psychiatric:         Mood and Affect: Mood normal.         Judgment: Judgment normal.       No visits with results within 1 Day(s) from this visit.   Latest known visit with results is: "   Admission on 03/29/2023, Discharged on 03/30/2023   Component Date Value Ref Range Status    Group & Rh 03/29/2023 A POS   Final    Indirect Jonelle GEL 03/29/2023 NEG   Final    Specimen Outdate 03/29/2023 04/01/2023 23:59   Final         Assessment:       1. Ecchymoses, spontaneous         Plan:       Patient with bruising on arms, c/w diagnosis senile purpura, thin skin, which is a combination of things: COPD on chronic inhaled steroids, recent steroid use for colitis, daily use of NSAIDS and h/o sun exposure.    Recommend sun protection, and also to cover skin to prevent trauma, limit NSAID use as tolerated.     Will check CBC and PT/PTT today just to be sure okay.    Nothing else to add from hematology standpoint. I do not think this represents any serious bleeding disorder or condition.    RTC PRN.    Gaby Brooke MD

## 2025-02-04 NOTE — PROGRESS NOTES
Please let her know her labs were all normal/good. Nothing else to add from what I told her at our visit. Thanks!

## (undated) DEVICE — TRAY SKIN SCRUB WET PREMIUM

## (undated) DEVICE — GOWN X-LG STERILE BACK

## (undated) DEVICE — TRAY CATH FOL SIL URIMTR 16FR

## (undated) DEVICE — DRESSING TRANS 4X4 TEGADERM

## (undated) DEVICE — KIT DRAIN WOUND RND SPRNG RESV

## (undated) DEVICE — ELECTRODE BLADE E-Z CLEAN 4IN

## (undated) DEVICE — ELECTRODE PATIENT RETURN DISP

## (undated) DEVICE — DRAPE C-ARMOR EQUIPMENT COVER

## (undated) DEVICE — SUT MONOCYRL 4-0 PS2 UND

## (undated) DEVICE — SPONGE PATTY NEURO SGL 0.5X6

## (undated) DEVICE — Device

## (undated) DEVICE — CATH IV CATHLON W/HUB14GAX

## (undated) DEVICE — TUBING SILICON CLR 3/16IN 10FT

## (undated) DEVICE — ADHESIVE DERMABOND ADVANCED

## (undated) DEVICE — DISH PETRI MED 3.5IN

## (undated) DEVICE — SHEET DRAPE TOP BAR - EMERALD

## (undated) DEVICE — SPONGE SURGIFOAM 100 8.5X12X10

## (undated) DEVICE — DRAPE ORTH SPLIT 77X108IN

## (undated) DEVICE — SUT VICRYL PLUS 0 CT-1 18IN

## (undated) DEVICE — NDL HYPO 22GX1 1/2 SYR 10ML LL

## (undated) DEVICE — KIT SURGIFLO HEMOSTATIC MATRIX

## (undated) DEVICE — GLOVE PROTEXIS PI SYN SURG 7

## (undated) DEVICE — GLOVE PROTEXIS PI SYN SURG 6.5

## (undated) DEVICE — SUT VICRYL 2 0 CT 2

## (undated) DEVICE — KIT SURGICAL TURNOVER

## (undated) DEVICE — GAUZE SPONGE 4X4 12PLY

## (undated) DEVICE — BUR BONE CUT MICRO TPS 3X3.8MM

## (undated) DEVICE — DRAPE SURG W/TWL 17 5/8X23

## (undated) DEVICE — DRAPE C-ARM COVER EZ 36X28IN